# Patient Record
Sex: FEMALE | Race: WHITE | NOT HISPANIC OR LATINO | Employment: OTHER | URBAN - METROPOLITAN AREA
[De-identification: names, ages, dates, MRNs, and addresses within clinical notes are randomized per-mention and may not be internally consistent; named-entity substitution may affect disease eponyms.]

---

## 2018-03-13 ENCOUNTER — HISTORICAL (OUTPATIENT)
Dept: RADIOLOGY | Facility: HOSPITAL | Age: 73
End: 2018-03-13

## 2021-03-20 ENCOUNTER — HOSPITAL ENCOUNTER (OUTPATIENT)
Dept: MEDSURG UNIT | Facility: HOSPITAL | Age: 76
End: 2021-03-22

## 2021-03-20 LAB
ABS NEUT (OLG): 15.6 X10(3)/MCL (ref 1.5–6.9)
ABS NEUT (OLG): 18.8 X10(3)/MCL (ref 1.5–6.9)
ALBUMIN SERPL-MCNC: 3.2 GM/DL (ref 3.4–4.8)
ALBUMIN SERPL-MCNC: 3.9 GM/DL (ref 3.4–4.8)
ALBUMIN/GLOB SERPL: 1 RATIO (ref 1.1–2)
ALBUMIN/GLOB SERPL: 1 RATIO (ref 1.1–2)
ALP SERPL-CCNC: 70 UNIT/L (ref 40–150)
ALP SERPL-CCNC: 88 UNIT/L (ref 40–150)
ALT SERPL-CCNC: 12 UNIT/L (ref 0–55)
ALT SERPL-CCNC: 14 UNIT/L (ref 0–55)
APPEARANCE, UA: CLEAR
AST SERPL-CCNC: 11 UNIT/L (ref 5–34)
AST SERPL-CCNC: 16 UNIT/L (ref 5–34)
BACTERIA SPEC CULT: NORMAL /HPF
BASOPHILS # BLD AUTO: 0 X10(3)/MCL (ref 0–0.1)
BASOPHILS # BLD AUTO: 0.1 X10(3)/MCL (ref 0–0.1)
BASOPHILS NFR BLD AUTO: 0 % (ref 0–1)
BASOPHILS NFR BLD AUTO: 0 % (ref 0–1)
BILIRUB SERPL-MCNC: 0.2 MG/DL
BILIRUB SERPL-MCNC: 0.5 MG/DL
BILIRUB UR QL STRIP: NEGATIVE
BILIRUBIN DIRECT+TOT PNL SERPL-MCNC: 0.1 MG/DL (ref 0–0.5)
BILIRUBIN DIRECT+TOT PNL SERPL-MCNC: 0.1 MG/DL (ref 0–0.8)
BILIRUBIN DIRECT+TOT PNL SERPL-MCNC: 0.2 MG/DL (ref 0–0.5)
BILIRUBIN DIRECT+TOT PNL SERPL-MCNC: 0.3 MG/DL (ref 0–0.8)
BNP BLD-MCNC: 40.6 PG/ML
BNP BLD-MCNC: 80.3 PG/ML
BUN SERPL-MCNC: 12 MG/DL (ref 9.8–20.1)
BUN SERPL-MCNC: 13 MG/DL (ref 9.8–20.1)
CALCIUM SERPL-MCNC: 10 MG/DL (ref 8.4–10.2)
CALCIUM SERPL-MCNC: 9.4 MG/DL (ref 8.4–10.2)
CHLORIDE SERPL-SCNC: 100 MMOL/L (ref 98–107)
CHLORIDE SERPL-SCNC: 97 MMOL/L (ref 98–107)
CK MB SERPL-MCNC: 1 NG/ML
CK MB SERPL-MCNC: 1.1 NG/ML
CK SERPL-CCNC: 35 U/L (ref 29–168)
CK SERPL-CCNC: 38 U/L (ref 29–168)
CO2 SERPL-SCNC: 29 MMOL/L (ref 23–31)
CO2 SERPL-SCNC: 32 MMOL/L (ref 23–31)
COLOR UR: YELLOW
CREAT SERPL-MCNC: 0.5 MG/DL (ref 0.55–1.02)
CREAT SERPL-MCNC: 0.52 MG/DL (ref 0.55–1.02)
D DIMER PPP IA.FEU-MCNC: 0.35 MCG/ML FEU
EOSINOPHIL # BLD AUTO: 0.1 X10(3)/MCL (ref 0–0.6)
EOSINOPHIL NFR BLD AUTO: 1 % (ref 0–5)
ERYTHROCYTE [DISTWIDTH] IN BLOOD BY AUTOMATED COUNT: 13.9 % (ref 11.5–17)
ERYTHROCYTE [DISTWIDTH] IN BLOOD BY AUTOMATED COUNT: 14.1 % (ref 11.5–17)
GLOBULIN SER-MCNC: 3.3 GM/DL (ref 2.4–3.5)
GLOBULIN SER-MCNC: 3.9 GM/DL (ref 2.4–3.5)
GLUCOSE (UA): NEGATIVE MG/DL
GLUCOSE SERPL-MCNC: 127 MG/DL (ref 82–115)
GLUCOSE SERPL-MCNC: 206 MG/DL (ref 82–115)
HCT VFR BLD AUTO: 34.5 % (ref 36–48)
HCT VFR BLD AUTO: 40.7 % (ref 36–48)
HGB BLD-MCNC: 10.7 GM/DL (ref 12–16)
HGB BLD-MCNC: 12.2 GM/DL (ref 12–16)
HGB UR QL STRIP: ABNORMAL
IMM GRANULOCYTES # BLD AUTO: 0.09 10*3/UL (ref 0–0.02)
IMM GRANULOCYTES # BLD AUTO: 0.12 10*3/UL (ref 0–0.02)
IMM GRANULOCYTES NFR BLD AUTO: 0.5 % (ref 0–0.43)
IMM GRANULOCYTES NFR BLD AUTO: 0.6 % (ref 0–0.43)
KETONES UR QL STRIP: NEGATIVE MG/DL
LACTATE SERPL-SCNC: 1 MMOL/L (ref 0.5–2.2)
LACTATE SERPL-SCNC: 1.8 MMOL/L (ref 0.5–2.2)
LACTATE SERPL-SCNC: 2.2 MMOL/L (ref 0.5–2.2)
LACTATE SERPL-SCNC: 2.4 MMOL/L (ref 0.5–2.2)
LACTATE SERPL-SCNC: 2.8 MMOL/L (ref 0.5–2.2)
LACTATE SERPL-SCNC: 3 MMOL/L (ref 0.5–2.2)
LACTATE SERPL-SCNC: 3 MMOL/L (ref 0.5–2.2)
LACTATE SERPL-SCNC: 3.8 MMOL/L (ref 0.5–2.2)
LDH SERPL-CCNC: 116 UNIT/L (ref 140–271)
LEUKOCYTE ESTERASE UR QL STRIP: ABNORMAL
LYMPHOCYTES # BLD AUTO: 0.5 X10(3)/MCL (ref 0.5–4.1)
LYMPHOCYTES # BLD AUTO: 1.9 X10(3)/MCL (ref 0.5–4.1)
LYMPHOCYTES NFR BLD AUTO: 10 % (ref 15–40)
LYMPHOCYTES NFR BLD AUTO: 2 % (ref 15–40)
MCH RBC QN AUTO: 27 PG (ref 27–34)
MCH RBC QN AUTO: 28 PG (ref 27–34)
MCHC RBC AUTO-ENTMCNC: 30 GM/DL (ref 31–36)
MCHC RBC AUTO-ENTMCNC: 31 GM/DL (ref 31–36)
MCV RBC AUTO: 90 FL (ref 80–99)
MCV RBC AUTO: 92 FL (ref 80–99)
MONOCYTES # BLD AUTO: 0.1 X10(3)/MCL (ref 0–1.1)
MONOCYTES # BLD AUTO: 1.5 X10(3)/MCL (ref 0–1.1)
MONOCYTES NFR BLD AUTO: 1 % (ref 4–12)
MONOCYTES NFR BLD AUTO: 8 % (ref 4–12)
NEUTROPHILS # BLD AUTO: 15.6 X10(3)/MCL (ref 1.5–6.9)
NEUTROPHILS # BLD AUTO: 18.8 X10(3)/MCL (ref 1.5–6.9)
NEUTROPHILS NFR BLD AUTO: 81 % (ref 43–75)
NEUTROPHILS NFR BLD AUTO: 96 % (ref 43–75)
NITRITE UR QL STRIP: NEGATIVE
PH UR STRIP: 7 [PH] (ref 4.6–8)
PLATELET # BLD AUTO: 318 X10(3)/MCL (ref 140–400)
PLATELET # BLD AUTO: 383 X10(3)/MCL (ref 140–400)
PMV BLD AUTO: 9.3 FL (ref 6.8–10)
PMV BLD AUTO: 9.6 FL (ref 6.8–10)
POTASSIUM SERPL-SCNC: 3.1 MMOL/L (ref 3.5–5.1)
POTASSIUM SERPL-SCNC: 3.5 MMOL/L (ref 3.5–5.1)
PROT SERPL-MCNC: 6.5 GM/DL (ref 5.8–7.6)
PROT SERPL-MCNC: 7.8 GM/DL (ref 5.8–7.6)
PROT UR QL STRIP: NEGATIVE MG/DL
RBC # BLD AUTO: 3.84 X10(6)/MCL (ref 4.2–5.4)
RBC # BLD AUTO: 4.45 X10(6)/MCL (ref 4.2–5.4)
RBC #/AREA URNS HPF: NORMAL /[HPF]
SARS-COV-2 RNA RESP QL NAA+PROBE: NOT DETECTED
SODIUM SERPL-SCNC: 139 MMOL/L (ref 136–145)
SODIUM SERPL-SCNC: 140 MMOL/L (ref 136–145)
SP GR UR STRIP: 1.02 (ref 1–1.03)
SQUAMOUS EPITHELIAL, UA: NORMAL /LPF
TROPONIN I SERPL-MCNC: 0.01 NG/ML (ref 0–0.04)
TROPONIN I SERPL-MCNC: 0.01 NG/ML (ref 0–0.04)
UROBILINOGEN UR STRIP-ACNC: 0.2 EU/DL
WBC # SPEC AUTO: 19.4 X10(3)/MCL (ref 4.5–11.5)
WBC # SPEC AUTO: 19.6 X10(3)/MCL (ref 4.5–11.5)
WBC #/AREA URNS HPF: NORMAL /HPF

## 2021-03-21 LAB
ABS NEUT (OLG): 21.2 X10(3)/MCL (ref 1.5–6.9)
ALBUMIN SERPL-MCNC: 3.1 GM/DL (ref 3.4–4.8)
ALBUMIN/GLOB SERPL: 1 RATIO (ref 1.1–2)
ALP SERPL-CCNC: 65 UNIT/L (ref 40–150)
ALT SERPL-CCNC: 11 UNIT/L (ref 0–55)
AST SERPL-CCNC: 11 UNIT/L (ref 5–34)
BASOPHILS NFR BLD MANUAL: 0 % (ref 0–1)
BILIRUB SERPL-MCNC: <0.5 MG/DL
BILIRUBIN DIRECT+TOT PNL SERPL-MCNC: 0.1 MG/DL (ref 0–0.5)
BILIRUBIN DIRECT+TOT PNL SERPL-MCNC: <0.4 MG/DL (ref 0–0.8)
BUN SERPL-MCNC: 11 MG/DL (ref 9.8–20.1)
CALCIUM SERPL-MCNC: 9.4 MG/DL (ref 8.4–10.2)
CHLORIDE SERPL-SCNC: 100 MMOL/L (ref 98–107)
CO2 SERPL-SCNC: 32 MMOL/L (ref 23–31)
COLOR STL: NORMAL
CONSISTENCY STL: NORMAL
CREAT SERPL-MCNC: 0.48 MG/DL (ref 0.55–1.02)
EOSINOPHIL NFR BLD MANUAL: 0 % (ref 0–5)
ERYTHROCYTE [DISTWIDTH] IN BLOOD BY AUTOMATED COUNT: 14 % (ref 11.5–17)
FERRITIN SERPL-MCNC: 210.37 NG/ML (ref 4.63–204)
GLOBULIN SER-MCNC: 3.1 GM/DL (ref 2.4–3.5)
GLUCOSE SERPL-MCNC: 127 MG/DL (ref 82–115)
GRANULOCYTES NFR BLD MANUAL: 93 % (ref 47–80)
HCT VFR BLD AUTO: 31.9 % (ref 36–48)
HEMOCCULT SP1 STL QL: NEGATIVE
HGB BLD-MCNC: 9.9 GM/DL (ref 12–16)
IRON SATN MFR SERPL: 8 % (ref 20–50)
IRON SERPL-MCNC: 22 UG/DL (ref 50–170)
LYMPHOCYTES NFR BLD MANUAL: 3 % (ref 15–40)
MAGNESIUM SERPL-MCNC: 2 MG/DL (ref 1.6–2.6)
MCH RBC QN AUTO: 28 PG (ref 27–34)
MCHC RBC AUTO-ENTMCNC: 31 GM/DL (ref 31–36)
MCV RBC AUTO: 89 FL (ref 80–99)
MONOCYTES NFR BLD MANUAL: 3 % (ref 4–12)
NEUTS BAND NFR BLD MANUAL: 1 % (ref 1–5)
PHOSPHATE SERPL-MCNC: 1.8 MG/DL (ref 2.3–4.7)
PLATELET # BLD AUTO: 321 X10(3)/MCL (ref 140–400)
PLATELET # BLD EST: ADEQUATE 10*3/UL
PMV BLD AUTO: 9.2 FL (ref 6.8–10)
POTASSIUM SERPL-SCNC: 2.6 MMOL/L (ref 3.5–5.1)
PROT SERPL-MCNC: 6.2 GM/DL (ref 5.8–7.6)
RBC # BLD AUTO: 3.59 X10(6)/MCL (ref 4.2–5.4)
RBC MORPH BLD: NORMAL
SODIUM SERPL-SCNC: 140 MMOL/L (ref 136–145)
TIBC SERPL-MCNC: 270 UG/DL (ref 70–310)
TIBC SERPL-MCNC: 292 UG/DL (ref 250–450)
WBC # SPEC AUTO: 22.6 X10(3)/MCL (ref 4.5–11.5)

## 2021-03-22 LAB
ABS NEUT (OLG): 21.9 X10(3)/MCL (ref 1.5–6.9)
BASOPHILS NFR BLD MANUAL: 0 % (ref 0–1)
BUN SERPL-MCNC: 18 MG/DL (ref 9.8–20.1)
CALCIUM SERPL-MCNC: 9.1 MG/DL (ref 8.4–10.2)
CHLORIDE SERPL-SCNC: 100 MMOL/L (ref 98–107)
CO2 SERPL-SCNC: 32 MMOL/L (ref 23–31)
CREAT SERPL-MCNC: 0.53 MG/DL (ref 0.55–1.02)
CREAT/UREA NIT SERPL: 34
EOSINOPHIL NFR BLD MANUAL: 0 % (ref 0–5)
ERYTHROCYTE [DISTWIDTH] IN BLOOD BY AUTOMATED COUNT: 14.6 % (ref 11.5–17)
GLUCOSE SERPL-MCNC: 128 MG/DL (ref 82–115)
GRANULOCYTES NFR BLD MANUAL: 95 % (ref 47–80)
HCT VFR BLD AUTO: 33.1 % (ref 36–48)
HGB BLD-MCNC: 10.3 GM/DL (ref 12–16)
LYMPHOCYTES NFR BLD MANUAL: 3 % (ref 15–40)
MCH RBC QN AUTO: 28 PG (ref 27–34)
MCHC RBC AUTO-ENTMCNC: 31 GM/DL (ref 31–36)
MCV RBC AUTO: 89 FL (ref 80–99)
MONOCYTES NFR BLD MANUAL: 2 % (ref 4–12)
PLATELET # BLD AUTO: 370 X10(3)/MCL (ref 140–400)
PLATELET # BLD EST: ADEQUATE 10*3/UL
PMV BLD AUTO: 9.5 FL (ref 6.8–10)
POTASSIUM SERPL-SCNC: 3.6 MMOL/L (ref 3.5–5.1)
RBC # BLD AUTO: 3.73 X10(6)/MCL (ref 4.2–5.4)
RBC MORPH BLD: NORMAL
SODIUM SERPL-SCNC: 140 MMOL/L (ref 136–145)
WBC # SPEC AUTO: 23.5 X10(3)/MCL (ref 4.5–11.5)

## 2021-03-25 LAB
FINAL CULTURE: NORMAL
FINAL CULTURE: NORMAL

## 2022-04-30 NOTE — H&P
HISTORY OF PRESENT ILLNESS:  She is a patient of Dr. Garrett, who presented to the emergency room today with a COPD exacerbation/shortness of breath.  She also has a small pneumonia versus small effusion on the right lower lobe.  She has interstitial edema of the lower lungs as well.  She is very anxious also.  She is a former smoker, quit approximately 25 years ago.  She has had previous chest tube insertion secondary to a bleb from her emphysema which had to be excised also.  She has been followed by Dr. Mcdowell in [place] for Pulmonology.  She is on home O2 and nebulizer.    PAST MEDICAL HISTORY:  Otherwise, is significant for hypertension.    PAST SURGERIES:  Are as stated above.  She had a hysterectomy and cholecystectomy also, along with insertion of a chest tube for a ruptured bleb and later a left lobectomy.    ALLERGIES:  INCLUDE LEVAQUIN.      MEDICATIONS:  On the chart and reviewed.    IMMUNIZATIONS:  She states she did not get a flu shot this year.  She thinks her pneumonia shot is up to date and she did not get a COVID vaccination this year.    FAMILY MEDICAL HISTORY:  Noncontributory.    CODE STATUS:  She is full code.    SOCIAL HISTORY:  She quit smoking 25 years ago, as stated above.  She does not use alcohol.  She is .    PHYSICAL EXAMINATION:  VITAL SIGNS:  Patient's vital signs are within normal limits except for blood pressure 145/82 and pulse rate 120.  Pulse ox on 2 L of oxygen per nasal cannula is between 94% and 98%.     HEENT: Examination is grossly within normal limits.     NECK:  Supple. Full range of motion.  No significant adenopathy.  She is overall a very thin appearing small lady with pursed mouth breathing.     HEART: Is tachycardic, distant. Regular rhythm.       LUNGS:  Diminished breath sounds in the bases bilaterally.     ABDOMEN:  Nontender, nondistended, normoactive bowel sounds.   EXTREMITIES: No significant edema.     /RECTAL/BREASTS:  Examination deferred at  this time.   NEUROLOGICAL: Moves all 4 extremities well.  She is alert and oriented x3.    LABS:  Reviewed and on the chart along with x-rays.    ASSESSMENT:    1. Chronic obstructive pulmonary disease exacerbation.  2. Pneumonia versus pleural effusion.  3. Interstitial edema.  4. Elevated lactic acid.  5. Anxiety.  6. Hypertension.  7. Tachycardia.    PLAN:  Give Xanax and help control blood pressure as well.  Give steroids and neb treatments, antibiotics.        PBS/MODL   DD: 03/20/2021 1357   DT: 03/20/2021 1415  Job # [id number]

## 2022-04-30 NOTE — ED PROVIDER NOTES
Patient:   Kylie Almonte            MRN: [id number]            FIN: [id number]               Age:   75 years     Sex:  Female     :  1945   Associated Diagnoses:   COPD exacerbation   Author:   Sushant Manzano MD      Basic Information   Time seen: Date & time 3/20/2021 06:50:00.   History source: Patient.   Arrival mode: Ambulance.   History limitation: None.   Additional information: Chief Complaint from Nursing Triage Note : Chief Complaint   3/20/2021 6:55 CDT       Chief Complaint           brought in by [facility]. respiratory distress x 1 week. accessory muscle use. coughing up thick, yellow/greenish phlegm  .   Provider/Visit info:   Time Seen:  Sushant Manzano MD / 2021 06:50  .   History of Present Illness   The patient presents with difficulty breathing, cough and wheezing.  The onset was Few days.  The course/duration of symptoms is constant.  Degree at onset mild.  Degree at present mild.  The Exacerbating factors is none.  There are Relieving factorss including oxygen and beta-agonist.  Risk factors consist of hypertension and chronic obstructive pulmonary disease.  Prior episodes: frequent.  Therapy today: oxygen.  Associated symptoms: none.        Review of Systems   Constitutional symptoms:  No fever, no chills, no sweats.    Skin symptoms:  No jaundice, no rash.    Eye symptoms:  Negative except as documented in HPI.   ENMT symptoms:  No sore throat,    Respiratory symptoms:  Shortness of breath, cough, No wheezing, , Sputum production: Yellow, green.    Cardiovascular symptoms:  No chest pain, no palpitations, no tachycardia.    Gastrointestinal symptoms:  No abdominal pain, no nausea, no vomiting, no diarrhea, no constipation.    Genitourinary symptoms:  No dysuria,    Musculoskeletal symptoms:  No back pain,    Neurologic symptoms:  No headache, no dizziness.    Psychiatric symptoms:  No anxiety, no depression, no substance abuse.    Allergy/immunologic symptoms:  No  seasonal allergies,              Additional review of systems information: All other systems reviewed and otherwise negative.      Health Status   Allergies:    Allergic Reactions (Selected)  Severity Not Documented  Levaquin- Rash..   Medications:  (Selected)   Documented Medications  Documented  alPRAzolam 0.25 mg oral tab: 0.125 mg, 0.5-1 tab(s), Oral, TID  albuterol-ipratropium 2.5 mg-0.5 mg/3 mL inhalation solution: 3 mL, NEB, QID  alendronate 70 mg oral tablet: 70 mg = 1 tab(s), Oral, qWeek  amLODIPine 5 mg oral tablet: 5 mg = 1 tab(s), Oral, Daily  atorvastatin 10 mg oral tablet: 10 mg = 1 tab(s), Oral, qPM  doxycycline monohydrate 50 mg oral tablet: 100 mg = 2 tab(s), Oral, BID  omeprazole 40 mg oral DR capsule: 40 mg = 1 cap(s), Oral, Daily  prednisONE 20 mg oral tablet: 20 mg = 1 tab(s), Oral, BID, per nurse's notes.      Past Medical/ Family/ Social History   Medical history:    Active  COPD - Chronic obstructive pulmonary disease (SNOMED CT 702931337)  HTN - Hypertension (SNOMED CT 0269403993)  Chronic emphysema (SNOMED CT 366920627)  Anxiety disorder (SNOMED CT 131922818)  Resolved  Bullous emphysema with collapse (SNOMED CT 014779848):  Resolved., Reviewed as documented in chart.   Surgical history:    Cholecystectomy (53358626).  Hysterectomy (341546344).  Insertion of chest tube Multiple Times (5895310316).  Excision of emphysematous bleb of lung Left (6502242935)., Reviewed as documented in chart.   Family history:    Hypertension.  Father ()  Mother ()  Unknown cause of morbidity or mortality.....  Father ()  , Reviewed as documented in chart.   Social history:    Social & Psychosocial Habits    Alcohol  2021  Use: Never    2021  Use: Never    Substance Use  2021  Use: Never    Tobacco  2021  Use: Former smoker, quit more    Type: Cigarettes    Patient Wants Consult For Cessation Counseling No    2021  Use: Former smoker, quit more    Patient  Wants Consult For Cessation Counseling N/A    Abuse/Neglect  02/03/2021  SHX Any signs of abuse or neglect No    03/20/2021  SHX Any signs of abuse or neglect No  , Reviewed as documented in chart.   Problem list:    Active Problems (4)  Anxiety disorder   Chronic emphysema   COPD - Chronic obstructive pulmonary disease   HTN - Hypertension   , per nurse's notes.      Physical Examination               Vital Signs   Vital Signs   3/20/2021 7:42 CDT       Heart Rate Monitored      99 bpm                             Respiratory Rate          28 br/min  HI                             SpO2                      98 %                             Saturation Probe Site     Hand, left                             Oxygen Therapy            Nasal cannula                             Oxygen Flow Rate          2 L/min    3/20/2021 6:55 CDT       Temperature Temporal Artery               36.6 DegC                             Peripheral Pulse Rate     118 bpm  HI                             SpO2                      94 %                             Oxygen Therapy            Room air                             Systolic Blood Pressure   152 mmHg  HI                             Diastolic Blood Pressure  107 mmHg  HI  .   Measurements   3/20/2021 6:55 CDT       Weight Dosing             35 kg                             Weight Measured and Calculated in Lbs     77.16 lb                             Weight Estimated          35 kg                             Height/Length Dosing      153 cm                             Height/Length Estimated   152 cm                             Body Mass Index Estimated 15.15 kg/m2  .   Basic Oxygen Information   3/20/2021 7:42 CDT       SpO2                      98 %                             Oxygen Therapy            Nasal cannula                             Oxygen Flow Rate          2 L/min    3/20/2021 6:55 CDT       SpO2                      94 %                             Oxygen Therapy             Room air  .   General:  Alert, no acute distress.    Skin:  Warm, dry.    Head:  Normocephalic.   Eye:  Extraocular movements are intact.   Ears, nose, mouth and throat:  Oral mucosa moist.   Cardiovascular:  Regular rate and rhythm, No murmur, Normal peripheral perfusion, No edema.    Respiratory:  Respirations: Regular, respiratory distress mild, Breath sounds: Right, diminished, wheezes present mild, Breath sounds: Bilateral, wheezes present mild.    Back:  Normal range of motion.   Musculoskeletal:  Normal ROM.   Chest wall   Gastrointestinal:  Soft, Nontender, Non distended, Normal bowel sounds.    Neurological:  Alert and oriented to person, place, time, and situation, normal motor observed, normal speech observed.    Lymphatics   Psychiatric:  Cooperative, appropriate mood & affect.       Medical Decision Making   Documents reviewed:  Emergency department nurses' notes.   Orders  Launch Order Profile (Selected)   Inpatient Orders  Ordered  Blood Glucose Monitoring POC: 21 7:21:00 CDT, Once-NOW, Stop date 21 7:21:00 CDT, 21 7:21:00 CDT  Cardiac Monitorin21 7:21:00 CDT, Constant Order, Place on telemetry.  Contact MD for order for Aspirin and NTG.: 21 7:21:00 CDT, Contact MD for order for Aspirin and NTG.  IVF Normal Saline NS Infusion 1,000 mL: 1,000 mL, 1,000 mL, IV, 150 mL/hr, start date 21 7:21:00 CDT, 1.25, m2  Oxygen Therapy: 21 7:21:00 CDT, Nasal Cannula, Maintain O2 saturation > 93%., CM Oxygen  Pulse Oximetry: 21 7:21:00 CDT, Stop date 21 7:21:00 CDT, Place on pulse oximetry.  Monitor oxygen saturation.  Ordered (Collected)  Urine Culture [id number]: Stat collect, 21 7:21:00 CDT, Urine, Collected, Nurse collect, [id number], Stop date 21 7:21:00 CDT  Ordered (Dispatched)  BNP: Stat collect, 21 7:21:00 CDT, Blood, Stop date 21 7:21:00 CDT, Lab Collect, 21 7:21:00 CDT  Blood Culture: 21 7:21:00 CDT, Stat collect,  Blood, Stop date 03/20/21 7:21:00 CDT  Blood Culture: 03/20/21 7:21:00 CDT, Stat collect, Blood, Stop date 03/20/21 7:21:00 CDT  CBC w/ Auto Diff: Stat collect, 03/20/21 7:21:00 CDT, Blood, Stop date 03/20/21 7:21:00 CDT, Lab Collect, 03/20/21 7:21:00 CDT  CK MB: Stat collect, 03/20/21 7:21:00 CDT, Blood, Stop date 03/20/21 7:21:00 CDT, Lab Collect, Print Label By Order Location, 03/20/21 7:21:00 CDT  CMP: Stat collect, 03/20/21 7:21:00 CDT, Blood, Stop date 03/20/21 7:21:00 CDT, Lab Collect, 03/20/21 7:21:00 CDT  CPK: Stat collect, 03/20/21 7:21:00 CDT, Blood, Stop date 03/20/21 7:21:00 CDT, Lab Collect, Print Label By Order Location, 03/20/21 7:21:00 CDT  Lactic Acid: Stat collect, 03/20/21 7:21:00 CDT, Blood, Stop date 03/20/21 7:21:00 CDT, Lab Collect, 03/20/21 7:21:00 CDT  Troponin-I: Stat collect, 03/20/21 7:21:00 CDT, Blood, Stop date 03/20/21 7:21:00 CDT, Lab Collect, Print Label By Order Location, 03/20/21 7:21:00 CDT  Ordered (Exam Completed)  XR Chest 1 View: Stat, 03/20/21 7:03:00 CDT, Dyspnea, None, Stretcher, Rad Type, Not Scheduled, 03/20/21 7:03:00 CDT, Not Scheduled  Canceled  Aerosol Treatment: 03/20/21 7:11:00 CDT, 03/20/21 7:11:00 CDT, [id number]  Completed  Aerosol Treatment: 03/20/21 7:11:00 CDT, 03/20/21 7:11:00 CDT, [id number]  Aerosol Treatment: 03/20/21 7:11:00 CDT, 03/20/21 7:11:00 CDT, [id number]  Blood Pressure: 03/20/21 7:21:00 CDT, Stop date 03/20/21 7:21:00 CDT, Monitor blood pressure.  DuoNeb 0.5 mg-2.5 mg/3 mL inhalation solution: 3 mL, form: Soln-Inh, NEB, Once-NOW, first dose 03/20/21 7:11:00 CDT, stop date 03/20/21 7:11:00 CDT, STAT, On Compressed Air only  EKG Adult 12 Lead: 03/20/21 7:03:00 CDT, Stat, Once-NOW, 03/20/21 7:03:00 CDT, -1, -1, 03/20/21 7:03:00 CDT  Saline Lock Insert: 03/20/21 7:21:00 CDT, Once-NOW, Stop date 03/20/21 7:21:00 CDT  Solu-Medrol 125 mg injection: 125 mg, form: Injection, IV Push, Once-NOW, first dose 03/20/21 7:11:00 CDT, stop date 03/20/21  7:11:00 CDT, STAT  UA Only Microscopic: Stat collect, Urine, Collected, 03/20/21 7:21:00 CDT, Stop date 03/20/21 7:21:00 CDT, Nurse collect  Urinalysis with Microscopic if Indicated: Stat collect, Urine, 03/20/21 7:21:00 CDT, Stop date 03/20/21 7:21:00 CDT, Nurse collect  budesonide 0.5 mg/2 mL inhalation suspension: 0.5 mg, form: Soln-Inh, NEB, Once-NOW, first dose 03/20/21 7:11:00 CDT, stop date 03/20/21 7:11:00 CDT, STAT, On Compressed Air only.   Electrocardiogram:  Time 3/20/2021 07:00:00, rate 113, normal sinus rhythm, No ST-T changes, no ectopy, normal SD & QRS intervals, Right axis, short SD, minimal ST depression inferior lateral and anterior leads.    Results review:  Lab results : Lab View   3/20/2021 8:22 CDT       Est Creat Clearance Ser   54.54 mL/min    3/20/2021 7:58 CDT       Sodium Lvl                140 mmol/L                             Potassium Lvl             3.5 mmol/L                             Chloride                  97 mmol/L  LOW                             CO2                       32 mmol/L  HI                             Calcium Lvl               10.0 mg/dL                             Glucose Lvl               127 mg/dL  HI                             BUN                       12.0 mg/dL                             Creatinine                0.50 mg/dL  LOW                             eGFR-AA                   >60  NA                             eGFR-JALEN                  >60 mls/min/1.73/m2  NA                             Bili Total                0.5 mg/dL                             Bili Direct               0.2 mg/dL                             Bili Indirect             0.30 mg/dL                             AST                       16 unit/L                             ALT                       14 unit/L                             Alk Phos                  88 unit/L                             Total Protein             7.8 gm/dL  HI                             Albumin Lvl                3.9 gm/dL                             Globulin                  3.9 gm/dL  HI                             A/G Ratio                 1.0 ratio  LOW                             BNP                       40.6 pg/mL                             Lactic Acid Lvl           2.8 mmol/L  CRIT                             Total CK                  38 U/L                             CK MB                     1.0 ng/mL                             Troponin-I                0.010 ng/mL                             WBC                       19.4 x10(3)/mcL  HI                             RBC                       4.45 x10(6)/mcL                             Hgb                       12.2 gm/dL                             Hct                       40.7 %                             Platelet                  383 x10(3)/mcL                             MCV                       92 fL                             MCH                       27 pg                             MCHC                      30 gm/dL  LOW                             RDW                       13.9 %                             MPV                       9.3 fL                             Abs Neut                  15.6 x10(3)/mcL  HI                             Neutro Auto               81 %  HI                             Lymph Auto                10 %  LOW                             Mono Auto                 8 %                             Eos Auto                  1 %                             Abs Eos                   0.1 x10(3)/mcL                             Basophil Auto             0 %                             Abs Neutro                15.6 x10(3)/mcL  HI                             Abs Lymph                 1.9 x10(3)/mcL                             Abs Mono                  1.5 x10(3)/mcL  HI                             Abs Baso                  0.1 x10(3)/mcL                             IG%                       0.500 %  HI                              IG#                       0.0900  HI    3/20/2021 7:24 CDT       SARS-CoV-2 PCR            Not Detected    3/20/2021 7:21 CDT       UA Appear                 Clear                             UA Color                  Yellow                             UA Spec Grav              1.020                             UA Bili                   Negative                             UA pH                     7.0                             UA Urobilinogen           0.2 EU/dL                             UA Blood                  Trace-intact                             UA Glucose                Negative mg/dL                             UA Ketones                Negative mg/dL                             UA Protein                Negative mg/dL                             UA Nitrite                Negative                             UA Leuk Est               Trace                             UA WBC                    2-5 /HPF                             UA RBC                    None Seen                             UA Bacteria               None Seen /HPF                             UA Squam Epithelial       Few /LPF  ,    No qualifying data available.    Radiology results:  Rad Results (ST)  < 12 hrs   Accession: [id number]  Order: XR Chest 1 View  Report Dt/Tm: 03/20/2021 09:12  Report:   EXAMINATION: AP view the chest     EXAMINATION DATE: 3/20/2021     COMPARISON: 2/3/2021     TECHNIQUE: As above     CLINICAL HISTORY: Dyspnea     FINDINGS:     Heart size within normal limits. The pulmonary vasculature is normal.     Coarse interstitial markings the lungs with hyperinflation and  blunting the bilateral costophrenic angles. Lucencies are identified  of the upper lungs.     IMPRESSION:     Query for interstitial edema of the lower lungs with changes of COPD.  Questionable small effusion on the right.    .      Reexamination/ Reevaluation   Time: 3/20/2021 07:53:00 .   Interventions: PowerOrders    Pharmacy:  cefTRIAXone 1 g injection (Order): 1 gm, IV, Daily, first dose 3/20/2021 7:53 CDT, STAT.   Time: 3/20/2021 08:30:00 .   Interventions: PowerOrders   Pharmacy:  Lactated Ringers 1000ml 1,000 mL (Order): 1,000 mL, 1,000 mL, IV, Once-NOW, 1,000 mL/hr, start date 3/20/2021 8:30 CDT, 1.25, m2.   Notes: Patient has elevated lactic acid, I am going to give her 1 L of Ringer's lactate bolus, and continue normal saline, her lactic acid is less than 4.5, and she is not hypotensive, she is an elderly lady so I am going to continue normal saline after that..   Time: 3/20/2021 09:36:00 .   Vital signs   results included from flowsheet : Vital Signs   3/20/2021 9:35 CDT       Respiratory Rate          20 br/min                             SpO2                      96 %                             Oxygen Therapy            Nasal cannula                             Oxygen Flow Rate          2 L/min    3/20/2021 9:34 CDT       Peripheral Pulse Rate     101 bpm  HI                             Heart Rate Monitored      105 bpm  HI                             Respiratory Rate          In Error br/min  (In Error)                            SpO2                      In Error %  (In Error)                            Oxygen Therapy            Nasal cannula                             Oxygen Flow Rate          2 L/min                             Systolic Blood Pressure   156 mmHg  HI                             Diastolic Blood Pressure  92 mmHg  HI                             Mean Arterial Pressure, Cuff              113 mmHg     Notes: Patient says she is feeling better, still has some dizziness and wheezing.    S1, S2 is audible no murmurs.  Chest decreased air entry bilaterally, minimal rhonchi audible bilaterally  Abdomen soft nondistended nontender  Extremities no pedal edema  2+ peripheral pulses bilaterally upper and lower extremities  Capillary refill is normal    Patient's oxygen saturation is up to 100% on 2 L nasal  cannula, she is comfortable she still has some tachycardia, her blood pressures maintained, appears like she still needs to be in the hospital.  Her lactic acid level is slightly elevated rest of the workup shows some increase congestion bilateral lung fields with emphysematous blebs bilaterally.    She has elevated lactic acid, she has been given IV antibiotics, I will admit her in the hospital, continue her on oxygen, Neb treatments, antibiotics, she does have a small pleural effusion on the right side,.      Impression and Plan   Diagnosis   COPD exacerbation (QPJ19-JR J44.1)      Calls-Consults   -  3/20/2021 09:40:00 .   Plan   Condition: Improved, Stable.    Disposition: Admit time  3/20/2021 09:43:00, Admit to Inpatient Telemetry Unit.    Orders: Launch Orders   Admit/Transfer/Discharge:  Admit as Inpatient (Order): 3/20/2021 9:43 CDT, Telemetry with Monitor Gerry MITTAL, Germain Jain, No, Acute exacerbation of COPD, pleural effusion,.

## 2022-04-30 NOTE — DISCHARGE SUMMARY
Patient:   Kylie Almonte            MRN: [id number]            FIN: [id number]               Age:   75 years     Sex:  Female     :  1945   Associated Diagnoses:   Iron deficiency anemia; Hypokalemia; COPD exacerbation   Author:   Germain Garrett MD      Discharge Information      Discharge Summary Information   Admitted  3/20/2021   Discharged  3/22/2021   Admitting physician     Germain Garrett MD.     Discharge diagnosis     Iron deficiency anemia (FJD54-CC D50.9).     Hypokalemia (QWJ57-YL E87.6).     COPD exacerbation (ZJC05-TC J44.1).        Physical Examination      Vital Signs (last 24 hrs)_____  Last Charted___________  Temp Oral     36.7 DegC  (MAR 22 12:)  Heart Rate Peripheral   H 117bpm  (MAR 22 12:)  Resp Rate         20 br/min  (MAR 22 11:12)  SBP      H 151mmHg  (MAR 22 12:33)  DBP      80 mmHg  (MAR 22 12:33)  SpO2      98 %  (MAR 22 12:33)     Measurements from flowsheet : Measurements   3/21/2021 14:02 CDT      Weight Estimated          35 kg                             Height/Length Estimated   153 cm                             Usual Weight              50 kg                             Body Mass Index Estimated 14.95 kg/m2                             Ideal Body Weight         45.4 kg        Hospital Course   Hospital Course   Medical management: 75-year-old female with COPD admitted with worsening shortness of breath despite outpatient treatment with oral prednisone, doxycycline, bronchodilators and oxygen.  Chest x-ray revealed no acute infiltration compared to previous film.  Treatment continued with IV steroids, bronchodilators and oxygen.  Hypokalemia corrected with oral and IV potassium.  After she received IV fluids hemoglobin was 10 with no clinical bleeding.  Iron studies were consistent with iron deficiency anemia.  Stool occult blood was negative.  She's had a good clinical response and wants to go home.  She remains dyspneic with minimal exertion  which is her baseline.  Pulse ox remains in the 90s.  She had previously been on hospice services for end-stage COPD but rechecks that at this time.  I will give her a longer tapering course of prednisone, begin by mouth iron therapy and she will resume her home medications, bronchodilators and oxygen..

## 2022-10-28 ENCOUNTER — LAB REQUISITION (OUTPATIENT)
Dept: LAB | Facility: HOSPITAL | Age: 77
End: 2022-10-28
Payer: MEDICARE

## 2022-10-28 DIAGNOSIS — J20.9 ACUTE BRONCHITIS, UNSPECIFIED: ICD-10-CM

## 2022-10-28 LAB
ALBUMIN SERPL-MCNC: 3.1 GM/DL (ref 3.4–4.8)
ALBUMIN/GLOB SERPL: 0.8 RATIO (ref 1.1–2)
ALP SERPL-CCNC: 123 UNIT/L (ref 40–150)
ALT SERPL-CCNC: 16 UNIT/L (ref 0–55)
AST SERPL-CCNC: 19 UNIT/L (ref 5–34)
BILIRUBIN DIRECT+TOT PNL SERPL-MCNC: 0.3 MG/DL
BNP BLD-MCNC: 34.8 PG/ML
BUN SERPL-MCNC: 18 MG/DL (ref 9.8–20.1)
CALCIUM SERPL-MCNC: 9.7 MG/DL (ref 8.4–10.2)
CHLORIDE SERPL-SCNC: 98 MMOL/L (ref 98–107)
CO2 SERPL-SCNC: 30 MMOL/L (ref 23–31)
CREAT SERPL-MCNC: 0.53 MG/DL (ref 0.55–1.02)
ERYTHROCYTE [DISTWIDTH] IN BLOOD BY AUTOMATED COUNT: 13.7 % (ref 11.5–17)
GFR SERPLBLD CREATININE-BSD FMLA CKD-EPI: >60 MLS/MIN/1.73/M2
GLOBULIN SER-MCNC: 3.8 GM/DL (ref 2.4–3.5)
GLUCOSE SERPL-MCNC: 95 MG/DL (ref 82–115)
HCT VFR BLD AUTO: 34.7 % (ref 37–47)
HGB BLD-MCNC: 10.5 GM/DL (ref 12–16)
MCH RBC QN AUTO: 26.3 PG (ref 27–31)
MCHC RBC AUTO-ENTMCNC: 30.3 MG/DL (ref 33–36)
MCV RBC AUTO: 87 FL (ref 80–94)
PLATELET # BLD AUTO: 386 X10(3)/MCL (ref 130–400)
PMV BLD AUTO: 10.2 FL (ref 7.4–10.4)
POTASSIUM SERPL-SCNC: 4.5 MMOL/L (ref 3.5–5.1)
PROT SERPL-MCNC: 6.9 GM/DL (ref 5.8–7.6)
RBC # BLD AUTO: 3.99 X10(6)/MCL (ref 4.2–5.4)
SODIUM SERPL-SCNC: 137 MMOL/L (ref 136–145)
WBC # SPEC AUTO: 15.8 X10(3)/MCL (ref 4.5–11.5)

## 2022-10-28 PROCEDURE — 80053 COMPREHEN METABOLIC PANEL: CPT | Performed by: FAMILY MEDICINE

## 2022-10-28 PROCEDURE — 85027 COMPLETE CBC AUTOMATED: CPT | Performed by: FAMILY MEDICINE

## 2022-10-28 PROCEDURE — 83880 ASSAY OF NATRIURETIC PEPTIDE: CPT | Performed by: FAMILY MEDICINE

## 2023-02-22 ENCOUNTER — HOSPITAL ENCOUNTER (EMERGENCY)
Facility: HOSPITAL | Age: 78
Discharge: HOME | End: 2023-02-22
Attending: STUDENT IN AN ORGANIZED HEALTH CARE EDUCATION/TRAINING PROGRAM
Payer: MEDICARE

## 2023-02-22 VITALS
DIASTOLIC BLOOD PRESSURE: 88 MMHG | WEIGHT: 80 LBS | OXYGEN SATURATION: 94 % | RESPIRATION RATE: 18 BRPM | SYSTOLIC BLOOD PRESSURE: 142 MMHG | HEART RATE: 109 BPM | TEMPERATURE: 98 F

## 2023-02-22 DIAGNOSIS — S41.111A ARM LACERATION, RIGHT, INITIAL ENCOUNTER: ICD-10-CM
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
## 2023-02-22 LAB
FLUAV AG UPPER RESP QL IA.RAPID: NOT DETECTED
FLUBV AG UPPER RESP QL IA.RAPID: NOT DETECTED
SARS-COV-2 RNA RESP QL NAA+PROBE: NOT DETECTED

## 2023-02-22 PROCEDURE — 12002 RPR S/N/AX/GEN/TRNK2.6-7.5CM: CPT

## 2023-02-22 PROCEDURE — 0240U COVID/FLU A&B PCR: CPT | Performed by: STUDENT IN AN ORGANIZED HEALTH CARE EDUCATION/TRAINING PROGRAM

## 2023-02-22 PROCEDURE — 99284 EMERGENCY DEPT VISIT MOD MDM: CPT | Mod: 25

## 2023-02-22 RX ORDER — LIDOCAINE HYDROCHLORIDE 10 MG/ML
5 INJECTION, SOLUTION EPIDURAL; INFILTRATION; INTRACAUDAL; PERINEURAL
Status: DISCONTINUED | OUTPATIENT
Start: 2023-02-22 | End: 2023-02-22 | Stop reason: HOSPADM

## 2023-02-22 NOTE — ED PROVIDER NOTES
Encounter Date: 2/22/2023       History     Chief Complaint   Patient presents with    Fall     Fall at [facility] this am around 0200. Multiple lac to R upper arm. Bleeding controlled.        HPI    77-year-old female with a past medical history as below on continuous oxygen presents emergency department after a fall the night.  Patient states she fell out of bed.  They changed to lay out of her bedroom yesterday which patient thinks is reason she fell.  Denies hitting her head but states she may have.  She is complaining of some low back pain which is new after the fall.  She thinks it may be from trying to get up.  She came in for sutures for her right arm.  She had multiple skin tears from the fall.    Review of patient's allergies indicates:   Allergen Reactions    Levofloxacin Rash     Past Medical History:   Diagnosis Date    Anemia, unspecified     Anxiety disorder, unspecified     COPD (chronic obstructive pulmonary disease)     GERD (gastroesophageal reflux disease)     Hypertension      No past surgical history on file.  No family history on file.  Social History     Tobacco Use    Smoking status: Unknown     Review of Systems   Constitutional:  Negative for fever.   Respiratory:  Positive for cough (chronic). Shortness of breath: chronic.   Cardiovascular:  Negative for chest pain.   Gastrointestinal:  Negative for abdominal pain.   Musculoskeletal:  Positive for back pain.   All other systems reviewed and are negative.    Physical Exam     Initial Vitals [02/22/23 0524]   BP Pulse Resp Temp SpO2   (!) 142/88 109 18 97.5 °F (36.4 °C) (!) 94 %      MAP       --         Physical Exam    Nursing note and vitals reviewed.  Constitutional: She appears well-developed and well-nourished. No distress.   Thin   Cardiovascular:  Normal rate and regular rhythm.           Pulmonary/Chest: Breath sounds normal. No respiratory distress.   Nasal cannula in place   Abdominal: Abdomen is soft. Bowel sounds are normal.  There is no abdominal tenderness.   Musculoskeletal:         General: Tenderness (mild tenderness to lumbar spine) present. Normal range of motion.     Neurological: She is alert and oriented to person, place, and time. GCS score is 15. GCS eye subscore is 4. GCS verbal subscore is 5. GCS motor subscore is 6.   Skin: Capillary refill takes less than 2 seconds.   There are 3 lacerations to the right upper arm.  There is a 6 cm curvilinear laceration to the upper arm that is approximately 0.5 cm in with through the dermis.  There are 2 smaller, 3 cm and 1 cm skin tears as well.   Psychiatric: She has a normal mood and affect. Thought content normal.       ED Course   Lac Repair    Date/Time: 2/22/2023 5:48 AM  Performed by: Castillo Everett MD  Authorized by: Castillo Everett MD     Consent:     Consent obtained:  Verbal    Consent given by:  Patient    Risks, benefits, and alternatives were discussed: yes      Risks discussed:  Infection, poor cosmetic result and need for additional repair    Alternatives discussed:  No treatment  Universal protocol:     Procedure explained and questions answered to patient or proxy's satisfaction: yes      Patient identity confirmed:  Verbally with patient  Anesthesia:     Anesthesia method:  Local infiltration    Local anesthetic:  Lidocaine 1% w/o epi  Laceration details:     Location:  Shoulder/arm    Shoulder/arm location:  R upper arm    Length (cm):  6  Pre-procedure details:     Preparation:  Patient was prepped and draped in usual sterile fashion  Exploration:     Hemostasis achieved with:  Direct pressure    Wound exploration: wound explored through full range of motion      Wound extent: no muscle damage noted and no vascular damage noted      Contaminated: no    Treatment:     Area cleansed with:  Soap and water    Amount of cleaning:  Standard  Skin repair:     Repair method:  Sutures    Suture size:  4-0    Wound skin closure material used: Ethylene.    Suture  technique:  Simple interrupted    Number of sutures:  8  Approximation:     Approximation:  Close  Repair type:     Repair type:  Simple  Post-procedure details:     Dressing:  Non-adherent dressing    Procedure completion:  Tolerated well, no immediate complications  Labs Reviewed   COVID/FLU A&B PCR - Normal    Narrative:     The Xpert Xpress SARS-CoV-2/FLU/RSV plus is a rapid, multiplexed real-time PCR test intended for the simultaneous qualitative detection and differentiation of SARS-CoV-2, Influenza A, Influenza B, and respiratory syncytial virus (RSV) viral RNA in either nasopharyngeal swab or nasal swab specimens.                Imaging Results              CT Lumbar Spine Without Contrast (Preliminary result)  Result time 02/22/23 07:06:45      Preliminary result by Nolan Calderon Jr., MD (02/22/23 07:06:45)                   Narrative:    START OF REPORT:  TECHNIQUE: CT OF THE LUMBAR SPINE WAS PERFORMED WITHOUT INTRAVENOUS CONTRAST WITH DIRECT AXIAL AS WELL AS SAGITTAL AND CORONAL RECONSTRUCTION IMAGES.    COMPARISON: NONE.    CLINICAL HISTORY: TRAUMA; LOW BACK PAIN.    Findings:  Anatomy: Unremarkable.  Mineralization: Mild osteopenia is seen of the visualized bony structures.  Congenital: None.  Bone alignment: Unremarkable with no significant listhesis.  Curvature: The lumbar lordosis is maintained.  Bone and bone marrow: There is non-acute appearing mild loss of vertebral body height of L1.  Intervertebral disc spaces: The intervertebral discs are preserved throughout.  Osteophytes: There are small degenerative anterior osteophytes at L1 L2 L3 L4 L5.  Endplate Sclerosis: No endplate sclerosis is seen.  Facet degenerative changes: No facet degenerative changes are seen.  Spinal canal: Mild stenosis of the spinal canal is seen at the L2-L3 L3-L4 L4-L5 intervertebral disc level.  Fractures: No acute fracture dislocation or subluxation is seen.  Orthopedic Hardware: None.  Vertebral Fusion:  None.  T12- L1: Normal.  L1- 2: Normal.  L2- 3: Normal.  L3- 4: Normal.  L4- 5: Broad based disc bulge. Mild central canal stenosis and thecal sac compression. Mild lateral recess stenosis. Associated mass effect on the bilateral traversing nerve root.  L5- S1: Normal.  Visualized sacrum: Normal.  Miscellaneous: No other significant findings.      Impression:  1. There is non-acute appearing mild loss of vertebral body height of L1.  2. Degenerative changes and other findings as above. No displaced fracture, subluxation or dislocation identified.                          Preliminary result by Interface, Rad Results In (02/22/23 07:06:45)                   Narrative:    START OF REPORT:  TECHNIQUE: CT OF THE LUMBAR SPINE WAS PERFORMED WITHOUT INTRAVENOUS CONTRAST WITH DIRECT AXIAL AS WELL AS SAGITTAL AND CORONAL RECONSTRUCTION IMAGES.    COMPARISON: NONE.    CLINICAL HISTORY: TRAUMA; LOW BACK PAIN.    Findings:  Anatomy: Unremarkable.  Mineralization: Mild osteopenia is seen of the visualized bony structures.  Congenital: None.  Bone alignment: Unremarkable with no significant listhesis.  Curvature: The lumbar lordosis is maintained.  Bone and bone marrow: There is non-acute appearing mild loss of vertebral body height of L1.  Intervertebral disc spaces: The intervertebral discs are preserved throughout.  Osteophytes: There are small degenerative anterior osteophytes at L1 L2 L3 L4 L5.  Endplate Sclerosis: No endplate sclerosis is seen.  Facet degenerative changes: No facet degenerative changes are seen.  Spinal canal: Mild stenosis of the spinal canal is seen at the L2-L3 L3-L4 L4-L5 intervertebral disc level.  Fractures: No acute fracture dislocation or subluxation is seen.  Orthopedic Hardware: None.  Vertebral Fusion: None.  T12- L1: Normal.  L1- 2: Normal.  L2- 3: Normal.  L3- 4: Normal.  L4- 5: Broad based disc bulge. Mild central canal stenosis and thecal sac compression. Mild lateral recess stenosis.  Associated mass effect on the bilateral traversing nerve root.  L5- S1: Normal.  Visualized sacrum: Normal.  Miscellaneous: No other significant findings.      Impression:  1. There is non-acute appearing mild loss of vertebral body height of L1.  2. Degenerative changes and other findings as above. No displaced fracture, subluxation or dislocation identified.                                         CT Head Without Contrast (Preliminary result)  Result time 02/22/23 07:05:31      Preliminary result by Nolan Calderon Jr., MD (02/22/23 07:05:31)                   Narrative:    START OF REPORT:  TECHNIQUE: CT OF THE HEAD WAS PERFORMED WITHOUT INTRAVENOUS CONTRAST WITH AXIAL AS WELL AS CORONAL AND SAGITTAL IMAGES.    COMPARISON: NONE.    DOSAGE INFORMATION: AUTOMATED EXPOSURE CONTROL WAS UTILIZED.    CLINICAL HISTORY: NONE.    Findings:  Hemorrhage: No acute intracranial hemorrhage is seen.  CSF spaces: The ventricles, sulci and basal cisterns all appear mildly prominent consistent with global cerebral atrophy.  Brain parenchyma: Unremarkable with preservation of the grey white junction throughout. Mild microvascular change is seen in portions of the periventricular and deep white matter tracts.  Cerebellum: Unremarkable.  Vascular: Mild atheromatous calcification of the intracranial arteries is seen.  Sella and skull base: The sella appears to be within normal limits for age.  Cerebellopontine angles: Within normal limits.  Herniation: None.  Intracranial calcifications: Incidental note is made of bilateral choroid plexus calcification. Incidental note is made of some pineal region calcification. Incidental note is made of some calcification of the falx.  Calvarium: No acute linear or depressed skull fracture is seen.  Scalp: No scalp soft tissue swelling is seen.    Maxillofacial Structures:  Paranasal sinuses: There is some mucoperiosteal thickening right sphenoid sinuses. This is consistent with acute and on  chronic sinusitis. The rest of the paranasal sinuses appear clear.  Orbits: The orbits appear unremarkable.  Zygomatic arches: The zygomatic arches are intact and unremarkable.  Temporal bones and mastoids: The temporal bones and mastoids appear unremarkable.  TMJ: The mandibular condyles appear normally placed with respect to the mandibular fossa.  Nasal Bones: The nasal septum is midline. No displaced nasal bone fracture is seen.    Visualized upper cervical spine: The visualized cervical spine appears unremarkable.      Impression:  1. No acute intracranial traumatic injury identified. Details and findings as noted above.                          Preliminary result by Interface, Rad Results In (02/22/23 07:05:31)                   Narrative:    START OF REPORT:  TECHNIQUE: CT OF THE HEAD WAS PERFORMED WITHOUT INTRAVENOUS CONTRAST WITH AXIAL AS WELL AS CORONAL AND SAGITTAL IMAGES.    COMPARISON: NONE.    DOSAGE INFORMATION: AUTOMATED EXPOSURE CONTROL WAS UTILIZED.    CLINICAL HISTORY: NONE.    Findings:  Hemorrhage: No acute intracranial hemorrhage is seen.  CSF spaces: The ventricles, sulci and basal cisterns all appear mildly prominent consistent with global cerebral atrophy.  Brain parenchyma: Unremarkable with preservation of the grey white junction throughout. Mild microvascular change is seen in portions of the periventricular and deep white matter tracts.  Cerebellum: Unremarkable.  Vascular: Mild atheromatous calcification of the intracranial arteries is seen.  Sella and skull base: The sella appears to be within normal limits for age.  Cerebellopontine angles: Within normal limits.  Herniation: None.  Intracranial calcifications: Incidental note is made of bilateral choroid plexus calcification. Incidental note is made of some pineal region calcification. Incidental note is made of some calcification of the falx.  Calvarium: No acute linear or depressed skull fracture is seen.  Scalp: No scalp soft tissue  swelling is seen.    Maxillofacial Structures:  Paranasal sinuses: There is some mucoperiosteal thickening right sphenoid sinuses. This is consistent with acute and on chronic sinusitis. The rest of the paranasal sinuses appear clear.  Orbits: The orbits appear unremarkable.  Zygomatic arches: The zygomatic arches are intact and unremarkable.  Temporal bones and mastoids: The temporal bones and mastoids appear unremarkable.  TMJ: The mandibular condyles appear normally placed with respect to the mandibular fossa.  Nasal Bones: The nasal septum is midline. No displaced nasal bone fracture is seen.    Visualized upper cervical spine: The visualized cervical spine appears unremarkable.      Impression:  1. No acute intracranial traumatic injury identified. Details and findings as noted above.                                         Medications   LIDOcaine (PF) 10 mg/ml (1%) injection 50 mg (has no administration in time range)     Medical Decision Making:   Differential Diagnosis:   Laceration, fracture, contusion, head injury, viral syndrome, covid           ED Course as of 02/22/23 0652   Wed Feb 22, 2023   0547 Family states that she had an episode of vomiting 2 nights ago.  There people in the nursing home to have COVID and there really concerned that she may have COVID.  They are requesting a COVID swab.  Patient denies any active symptoms at this time.  She does have a chronic cough [BS]   0550 Transition of care at 6 AM. Dr. Manzano will assume care and determine appropriate treatment and care of this patient.   [BS]   0648 I am Dr. Manzano, I assumed the care of patient at 6:00 a.m., patient had the laceration of the right arm which was repaired by Dr. Everett, and they were mainly waiting for CT head and CT lumbar spine to be red and COVID test to come back because they were concerned that patient might have COVID.    Although studies are essentially negative for any acute abnormality, I will go ahead and  discharge her back to the nursing home. [GQ]      ED Course User Index  [BS] Castillo Everett MD  [GQ] Sushant Manzano MD                 Clinical Impression:   Final diagnoses:  [W19.XXXA] Fall, initial encounter (Primary)  [S41.111A] Arm laceration, right, initial encounter        ED Disposition Condition    Discharge Stable          ED Prescriptions    None       Follow-up Information       Follow up With Specialties Details Why Contact Info    Germain Garrett MD Family Medicine Schedule an appointment as soon as possible for a visit   [address] [address]  [phone]      [facility] - Emergency Dept Emergency Medicine Go to  If symptoms worsen [address] [facility] [id number] [phone]             Sushant Manzano MD  02/22/23 9455

## 2023-02-22 NOTE — DISCHARGE INSTRUCTIONS
Sutures may be removed in 5-7 days        Patient must be seen by patient's primary care M.D. for follow-up and further treatment as needed.     Inform patients Primary Care physician about the ER visit and need for follow up.    Get medicines and orders from the emergency room approved by patient's primary care M.D.    The exam and treatment you received in Emergency Room was for an urgent problem and NOT INTENDED AS COMPLETE CARE. It is important that you FOLLOW UP with a doctor for ongoing care. If your symptoms become WORSE or you DO NOT IMPROVE and you are unable to reach your health care provider, you should RETURN to the emergency department. The Emergency Room doctor has provided a PRELIMINARY INTERPRETATION of all your STUDIES. A final interpretation may be done after you are discharged. IF A CHANGE in your diagnosis or treatment is needed WE WILL CONTACT YOU. It is critical that we have a CURRENT PHONE NUMBER FOR YOU.

## 2023-03-07 ENCOUNTER — HOSPITAL ENCOUNTER (EMERGENCY)
Facility: HOSPITAL | Age: 78
Discharge: ANOTHER HOSPITAL | End: 2023-03-07
Attending: INTERNAL MEDICINE
Payer: MEDICARE

## 2023-03-07 ENCOUNTER — HOSPITAL ENCOUNTER (INPATIENT)
Facility: HOSPITAL | Age: 78
LOS: 1 days | Discharge: HOME | DRG: 083 | End: 2023-03-08
Attending: EMERGENCY MEDICINE | Admitting: STUDENT IN AN ORGANIZED HEALTH CARE EDUCATION/TRAINING PROGRAM
Payer: MEDICARE

## 2023-03-07 VITALS
TEMPERATURE: 98 F | SYSTOLIC BLOOD PRESSURE: 130 MMHG | RESPIRATION RATE: 18 BRPM | HEART RATE: 95 BPM | BODY MASS INDEX: 14.72 KG/M2 | WEIGHT: 75 LBS | DIASTOLIC BLOOD PRESSURE: 74 MMHG | OXYGEN SATURATION: 95 % | HEIGHT: 60 IN

## 2023-03-07 DIAGNOSIS — S52.532A CLOSED COLLES' FRACTURE OF LEFT RADIUS, INITIAL ENCOUNTER: Primary | ICD-10-CM

## 2023-03-07 DIAGNOSIS — Z01.818 PREOP EXAMINATION: ICD-10-CM

## 2023-03-07 DIAGNOSIS — S62.102A WRIST FRACTURE, CLOSED, LEFT, INITIAL ENCOUNTER: ICD-10-CM
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
## 2023-03-07 DIAGNOSIS — I60.9 SUBARACHNOID HEMORRHAGE: Primary | ICD-10-CM

## 2023-03-07 DIAGNOSIS — I60.9 SUBARACHNOID HEMORRHAGE: ICD-10-CM

## 2023-03-07 PROBLEM — J96.11 CHRONIC RESPIRATORY FAILURE WITH HYPOXIA: Status: ACTIVE | Noted: 2020-01-16

## 2023-03-07 PROBLEM — E43 SEVERE PROTEIN-CALORIE MALNUTRITION (GOMEZ: LESS THAN 60% OF STANDARD WEIGHT): Status: ACTIVE | Noted: 2020-06-10

## 2023-03-07 PROBLEM — I10 HYPERTENSION: Status: ACTIVE | Noted: 2023-03-07

## 2023-03-07 PROBLEM — D12.4 ADENOMATOUS POLYP OF DESCENDING COLON: Status: ACTIVE | Noted: 2019-08-12

## 2023-03-07 PROBLEM — J44.9 CHRONIC OBSTRUCTIVE PULMONARY DISEASE: Status: ACTIVE | Noted: 2023-03-07

## 2023-03-07 PROBLEM — R91.1 PULMONARY NODULE: Status: ACTIVE | Noted: 2019-12-18

## 2023-03-07 PROBLEM — Z91.89 AT RISK OF PRESSURE INJURY OF SKIN: Status: ACTIVE | Noted: 2023-03-07

## 2023-03-07 PROBLEM — F41.9 ANXIETY DISORDER: Status: ACTIVE | Noted: 2023-03-07

## 2023-03-07 LAB
ALBUMIN SERPL-MCNC: 2.9 G/DL (ref 3.4–4.8)
ALBUMIN/GLOB SERPL: 0.7 RATIO (ref 1.1–2)
ALP SERPL-CCNC: 127 UNIT/L (ref 40–150)
ALT SERPL-CCNC: 14 UNIT/L (ref 0–55)
APTT PPP: 33.4 SECONDS (ref 23.2–33.7)
AST SERPL-CCNC: 14 UNIT/L (ref 5–34)
BASOPHILS # BLD AUTO: 0.06 X10(3)/MCL (ref 0–0.2)
BASOPHILS NFR BLD AUTO: 0.5 %
BILIRUBIN DIRECT+TOT PNL SERPL-MCNC: 0.2 MG/DL
BUN SERPL-MCNC: 22 MG/DL (ref 9.8–20.1)
CALCIUM SERPL-MCNC: 9.5 MG/DL (ref 8.4–10.2)
CHLORIDE SERPL-SCNC: 100 MMOL/L (ref 98–107)
CO2 SERPL-SCNC: 34 MMOL/L (ref 23–31)
CREAT SERPL-MCNC: 0.53 MG/DL (ref 0.55–1.02)
EOSINOPHIL # BLD AUTO: 0.27 X10(3)/MCL (ref 0–0.9)
EOSINOPHIL NFR BLD AUTO: 2.1 %
ERYTHROCYTE [DISTWIDTH] IN BLOOD BY AUTOMATED COUNT: 14.6 % (ref 11.5–17)
GFR SERPLBLD CREATININE-BSD FMLA CKD-EPI: >60 MLS/MIN/1.73/M2
GLOBULIN SER-MCNC: 4.3 GM/DL (ref 2.4–3.5)
GLUCOSE SERPL-MCNC: 132 MG/DL (ref 82–115)
HCT VFR BLD AUTO: 35.9 % (ref 37–47)
HGB BLD-MCNC: 10.5 G/DL (ref 12–16)
IMM GRANULOCYTES # BLD AUTO: 0.06 X10(3)/MCL (ref 0–0.04)
IMM GRANULOCYTES NFR BLD AUTO: 0.5 %
INR BLD: 1.01 (ref 0–1.3)
LYMPHOCYTES # BLD AUTO: 0.97 X10(3)/MCL (ref 0.6–4.6)
LYMPHOCYTES NFR BLD AUTO: 7.6 %
MCH RBC QN AUTO: 26.4 PG
MCHC RBC AUTO-ENTMCNC: 29.2 G/DL (ref 33–36)
MCV RBC AUTO: 90.4 FL (ref 80–94)
MONOCYTES # BLD AUTO: 0.72 X10(3)/MCL (ref 0.1–1.3)
MONOCYTES NFR BLD AUTO: 5.7 %
NEUTROPHILS # BLD AUTO: 10.61 X10(3)/MCL (ref 2.1–9.2)
NEUTROPHILS NFR BLD AUTO: 83.6 %
PLATELET # BLD AUTO: 467 X10(3)/MCL (ref 130–400)
PMV BLD AUTO: 9.3 FL (ref 7.4–10.4)
POTASSIUM SERPL-SCNC: 4.2 MMOL/L (ref 3.5–5.1)
PROT SERPL-MCNC: 7.2 GM/DL (ref 5.8–7.6)
PROTHROMBIN TIME: 13.6 SECONDS (ref 12.5–14.5)
RBC # BLD AUTO: 3.97 X10(6)/MCL (ref 4.2–5.4)
SODIUM SERPL-SCNC: 142 MMOL/L (ref 136–145)
WBC # SPEC AUTO: 12.7 X10(3)/MCL (ref 4.5–11.5)

## 2023-03-07 PROCEDURE — 93010 ELECTROCARDIOGRAM REPORT: CPT | Mod: ,,, | Performed by: INTERNAL MEDICINE

## 2023-03-07 PROCEDURE — 99285 EMERGENCY DEPT VISIT HI MDM: CPT | Mod: 25,27

## 2023-03-07 PROCEDURE — 85610 PROTHROMBIN TIME: CPT | Performed by: INTERNAL MEDICINE

## 2023-03-07 PROCEDURE — 25000003 PHARM REV CODE 250: Performed by: INTERNAL MEDICINE

## 2023-03-07 PROCEDURE — 99285 EMERGENCY DEPT VISIT HI MDM: CPT | Mod: 25

## 2023-03-07 PROCEDURE — 96375 TX/PRO/DX INJ NEW DRUG ADDON: CPT

## 2023-03-07 PROCEDURE — 80053 COMPREHEN METABOLIC PANEL: CPT | Performed by: INTERNAL MEDICINE

## 2023-03-07 PROCEDURE — 85025 COMPLETE CBC W/AUTO DIFF WBC: CPT | Performed by: INTERNAL MEDICINE

## 2023-03-07 PROCEDURE — 96374 THER/PROPH/DIAG INJ IV PUSH: CPT

## 2023-03-07 PROCEDURE — 63600175 PHARM REV CODE 636 W HCPCS: Performed by: INTERNAL MEDICINE

## 2023-03-07 PROCEDURE — 85730 THROMBOPLASTIN TIME PARTIAL: CPT | Performed by: INTERNAL MEDICINE

## 2023-03-07 PROCEDURE — 93005 ELECTROCARDIOGRAM TRACING: CPT

## 2023-03-07 PROCEDURE — 29105 APPLICATION LONG ARM SPLINT: CPT

## 2023-03-07 PROCEDURE — 11000001 HC ACUTE MED/SURG PRIVATE ROOM

## 2023-03-07 PROCEDURE — 93010 EKG 12-LEAD: ICD-10-PCS | Mod: ,,, | Performed by: INTERNAL MEDICINE

## 2023-03-07 RX ORDER — ONDANSETRON 2 MG/ML
4 INJECTION INTRAMUSCULAR; INTRAVENOUS
Status: COMPLETED | OUTPATIENT
Start: 2023-03-07 | End: 2023-03-07

## 2023-03-07 RX ORDER — SODIUM CHLORIDE 9 MG/ML
1000 INJECTION, SOLUTION INTRAVENOUS
Status: COMPLETED | OUTPATIENT
Start: 2023-03-07 | End: 2023-03-07

## 2023-03-07 RX ORDER — MORPHINE SULFATE 4 MG/ML
2 INJECTION, SOLUTION INTRAMUSCULAR; INTRAVENOUS
Status: COMPLETED | OUTPATIENT
Start: 2023-03-07 | End: 2023-03-07

## 2023-03-07 RX ORDER — LEVETIRACETAM 500 MG/5ML
1000 INJECTION, SOLUTION, CONCENTRATE INTRAVENOUS
Status: COMPLETED | OUTPATIENT
Start: 2023-03-07 | End: 2023-03-07

## 2023-03-07 RX ADMIN — SODIUM CHLORIDE 1000 ML: 9 INJECTION, SOLUTION INTRAVENOUS at 07:03

## 2023-03-07 RX ADMIN — MORPHINE SULFATE 2 MG: 4 INJECTION, SOLUTION INTRAMUSCULAR; INTRAVENOUS at 07:03

## 2023-03-07 RX ADMIN — ONDANSETRON 4 MG: 2 INJECTION INTRAMUSCULAR; INTRAVENOUS at 07:03

## 2023-03-07 RX ADMIN — LEVETIRACETAM 1000 MG: 100 INJECTION, SOLUTION INTRAVENOUS at 07:03

## 2023-03-08 VITALS
TEMPERATURE: 98 F | HEIGHT: 60 IN | SYSTOLIC BLOOD PRESSURE: 112 MMHG | DIASTOLIC BLOOD PRESSURE: 69 MMHG | WEIGHT: 84 LBS | RESPIRATION RATE: 17 BRPM | OXYGEN SATURATION: 92 % | HEART RATE: 88 BPM | BODY MASS INDEX: 16.49 KG/M2

## 2023-03-08 PROBLEM — S52.532A CLOSED COLLES' FRACTURE OF LEFT RADIUS: Status: ACTIVE | Noted: 2023-03-08

## 2023-03-08 LAB
ALBUMIN SERPL-MCNC: 2.4 G/DL (ref 3.4–4.8)
ALBUMIN/GLOB SERPL: 0.8 RATIO (ref 1.1–2)
ALP SERPL-CCNC: 112 UNIT/L (ref 40–150)
ALT SERPL-CCNC: 10 UNIT/L (ref 0–55)
AST SERPL-CCNC: 16 UNIT/L (ref 5–34)
BILIRUBIN DIRECT+TOT PNL SERPL-MCNC: 0.1 MG/DL
BUN SERPL-MCNC: 12.3 MG/DL (ref 9.8–20.1)
CALCIUM SERPL-MCNC: 7.5 MG/DL (ref 8.4–10.2)
CHLORIDE SERPL-SCNC: 106 MMOL/L (ref 98–107)
CO2 SERPL-SCNC: 26 MMOL/L (ref 23–31)
CREAT SERPL-MCNC: 0.4 MG/DL (ref 0.55–1.02)
GFR SERPLBLD CREATININE-BSD FMLA CKD-EPI: >60 MLS/MIN/1.73/M2
GLOBULIN SER-MCNC: 2.9 GM/DL (ref 2.4–3.5)
GLUCOSE SERPL-MCNC: 95 MG/DL (ref 82–115)
LACTATE SERPL-SCNC: 0.5 MMOL/L (ref 0.5–2.2)
LACTATE SERPL-SCNC: 0.6 MMOL/L (ref 0.5–2.2)
LACTATE SERPL-SCNC: 0.8 MMOL/L (ref 0.5–2.2)
LACTATE SERPL-SCNC: 1 MMOL/L (ref 0.5–2.2)
POTASSIUM SERPL-SCNC: 4 MMOL/L (ref 3.5–5.1)
PROT SERPL-MCNC: 5.3 GM/DL (ref 5.8–7.6)
SODIUM SERPL-SCNC: 141 MMOL/L (ref 136–145)

## 2023-03-08 PROCEDURE — 97166 OT EVAL MOD COMPLEX 45 MIN: CPT

## 2023-03-08 PROCEDURE — 83605 ASSAY OF LACTIC ACID: CPT | Performed by: STUDENT IN AN ORGANIZED HEALTH CARE EDUCATION/TRAINING PROGRAM

## 2023-03-08 PROCEDURE — 25000003 PHARM REV CODE 250: Performed by: STUDENT IN AN ORGANIZED HEALTH CARE EDUCATION/TRAINING PROGRAM

## 2023-03-08 PROCEDURE — 99223 1ST HOSP IP/OBS HIGH 75: CPT | Mod: ,,, | Performed by: NURSE PRACTITIONER

## 2023-03-08 PROCEDURE — 99223 PR INITIAL HOSPITAL CARE,LEVL III: ICD-10-PCS | Mod: ,,, | Performed by: NURSE PRACTITIONER

## 2023-03-08 PROCEDURE — 80053 COMPREHEN METABOLIC PANEL: CPT | Performed by: STUDENT IN AN ORGANIZED HEALTH CARE EDUCATION/TRAINING PROGRAM

## 2023-03-08 PROCEDURE — 97162 PT EVAL MOD COMPLEX 30 MIN: CPT

## 2023-03-08 RX ORDER — SODIUM CHLORIDE 9 MG/ML
INJECTION, SOLUTION INTRAVENOUS CONTINUOUS
Status: DISCONTINUED | OUTPATIENT
Start: 2023-03-08 | End: 2023-03-08 | Stop reason: HOSPADM

## 2023-03-08 RX ORDER — POTASSIUM CHLORIDE 20 MEQ/1
20 TABLET, EXTENDED RELEASE ORAL 2 TIMES DAILY
COMMUNITY
Start: 2023-02-22

## 2023-03-08 RX ORDER — FAMOTIDINE 20 MG/1
20 TABLET, FILM COATED ORAL 2 TIMES DAILY
Status: DISCONTINUED | OUTPATIENT
Start: 2023-03-08 | End: 2023-03-08 | Stop reason: HOSPADM

## 2023-03-08 RX ORDER — MORPHINE SULFATE 4 MG/ML
2 INJECTION, SOLUTION INTRAMUSCULAR; INTRAVENOUS
Status: DISCONTINUED | OUTPATIENT
Start: 2023-03-08 | End: 2023-03-08 | Stop reason: HOSPADM

## 2023-03-08 RX ORDER — ENOXAPARIN SODIUM 100 MG/ML
40 INJECTION SUBCUTANEOUS EVERY 12 HOURS
Status: DISCONTINUED | OUTPATIENT
Start: 2023-03-08 | End: 2023-03-08 | Stop reason: HOSPADM

## 2023-03-08 RX ORDER — TRAMADOL HYDROCHLORIDE 50 MG/1
50 TABLET ORAL EVERY 6 HOURS PRN
Qty: 12 TABLET | Refills: 0 | Status: SHIPPED | OUTPATIENT
Start: 2023-03-08

## 2023-03-08 RX ORDER — FLUTICASONE FUROATE AND VILANTEROL TRIFENATATE 200; 25 UG/1; UG/1
1 POWDER RESPIRATORY (INHALATION) DAILY
COMMUNITY
Start: 2023-02-08

## 2023-03-08 RX ORDER — OXYCODONE HYDROCHLORIDE 5 MG/1
5 TABLET ORAL EVERY 4 HOURS PRN
Status: DISCONTINUED | OUTPATIENT
Start: 2023-03-08 | End: 2023-03-08 | Stop reason: HOSPADM

## 2023-03-08 RX ORDER — ALPRAZOLAM 0.25 MG/1
0.25 TABLET ORAL DAILY PRN
COMMUNITY
Start: 2023-03-02 | End: 2023-04-17

## 2023-03-08 RX ORDER — GABAPENTIN 300 MG/1
300 CAPSULE ORAL 3 TIMES DAILY
COMMUNITY
Start: 2023-02-20

## 2023-03-08 RX ORDER — LEVETIRACETAM 500 MG/1
500 TABLET ORAL 2 TIMES DAILY
Status: DISCONTINUED | OUTPATIENT
Start: 2023-03-08 | End: 2023-03-08 | Stop reason: HOSPADM

## 2023-03-08 RX ORDER — DOCUSATE SODIUM 100 MG/1
100 CAPSULE, LIQUID FILLED ORAL 2 TIMES DAILY
Status: DISCONTINUED | OUTPATIENT
Start: 2023-03-08 | End: 2023-03-08 | Stop reason: HOSPADM

## 2023-03-08 RX ORDER — TALC
6 POWDER (GRAM) TOPICAL NIGHTLY PRN
Status: DISCONTINUED | OUTPATIENT
Start: 2023-03-08 | End: 2023-03-08 | Stop reason: HOSPADM

## 2023-03-08 RX ORDER — METHOCARBAMOL 750 MG/1
750 TABLET, FILM COATED ORAL 3 TIMES DAILY
Status: DISCONTINUED | OUTPATIENT
Start: 2023-03-08 | End: 2023-03-08 | Stop reason: HOSPADM

## 2023-03-08 RX ORDER — ALPRAZOLAM 0.5 MG/1
0.5 TABLET ORAL 4 TIMES DAILY PRN
COMMUNITY
Start: 2023-03-02

## 2023-03-08 RX ORDER — ALBUTEROL SULFATE 90 UG/1
2 AEROSOL, METERED RESPIRATORY (INHALATION)
COMMUNITY
Start: 2023-01-27

## 2023-03-08 RX ORDER — AMLODIPINE BESYLATE 5 MG/1
5 TABLET ORAL DAILY
COMMUNITY
Start: 2023-02-27

## 2023-03-08 RX ORDER — POLYETHYLENE GLYCOL 3350 17 G/17G
17 POWDER, FOR SOLUTION ORAL 2 TIMES DAILY
Status: DISCONTINUED | OUTPATIENT
Start: 2023-03-08 | End: 2023-03-08 | Stop reason: HOSPADM

## 2023-03-08 RX ORDER — BISACODYL 10 MG
10 SUPPOSITORY, RECTAL RECTAL DAILY PRN
Status: DISCONTINUED | OUTPATIENT
Start: 2023-03-08 | End: 2023-03-08 | Stop reason: HOSPADM

## 2023-03-08 RX ORDER — SERTRALINE HYDROCHLORIDE 50 MG/1
50 TABLET, FILM COATED ORAL DAILY
COMMUNITY
Start: 2023-02-15

## 2023-03-08 RX ORDER — LEVETIRACETAM 500 MG/1
500 TABLET ORAL 2 TIMES DAILY
Qty: 12 TABLET | Refills: 0 | Status: SHIPPED | OUTPATIENT
Start: 2023-03-08 | End: 2023-03-14

## 2023-03-08 RX ORDER — ACETAMINOPHEN 325 MG/1
650 TABLET ORAL EVERY 4 HOURS
Status: DISCONTINUED | OUTPATIENT
Start: 2023-03-08 | End: 2023-03-08 | Stop reason: HOSPADM

## 2023-03-08 RX ORDER — ALENDRONATE SODIUM 70 MG/1
70 TABLET ORAL
COMMUNITY
Start: 2023-02-27

## 2023-03-08 RX ORDER — ATORVASTATIN CALCIUM 10 MG/1
10 TABLET, FILM COATED ORAL DAILY
COMMUNITY
Start: 2023-02-09

## 2023-03-08 RX ADMIN — SODIUM CHLORIDE: 9 INJECTION, SOLUTION INTRAVENOUS at 07:03

## 2023-03-08 RX ADMIN — ACETAMINOPHEN 325MG 650 MG: 325 TABLET ORAL at 09:03

## 2023-03-08 RX ADMIN — POLYETHYLENE GLYCOL 3350 17 G: 17 POWDER, FOR SOLUTION ORAL at 08:03

## 2023-03-08 RX ADMIN — LEVETIRACETAM 500 MG: 500 TABLET, FILM COATED ORAL at 08:03

## 2023-03-08 RX ADMIN — SODIUM CHLORIDE: 9 INJECTION, SOLUTION INTRAVENOUS at 01:03

## 2023-03-08 RX ADMIN — DOCUSATE SODIUM 100 MG: 100 CAPSULE, LIQUID FILLED ORAL at 01:03

## 2023-03-08 RX ADMIN — FAMOTIDINE 20 MG: 20 TABLET, FILM COATED ORAL at 01:03

## 2023-03-08 RX ADMIN — LEVETIRACETAM 500 MG: 500 TABLET, FILM COATED ORAL at 01:03

## 2023-03-08 RX ADMIN — ACETAMINOPHEN 325MG 650 MG: 325 TABLET ORAL at 06:03

## 2023-03-08 RX ADMIN — FAMOTIDINE 20 MG: 20 TABLET, FILM COATED ORAL at 08:03

## 2023-03-08 RX ADMIN — ACETAMINOPHEN 325MG 650 MG: 325 TABLET ORAL at 01:03

## 2023-03-08 RX ADMIN — DOCUSATE SODIUM 100 MG: 100 CAPSULE, LIQUID FILLED ORAL at 08:03

## 2023-03-08 RX ADMIN — METHOCARBAMOL 750 MG: 750 TABLET ORAL at 08:03

## 2023-03-08 RX ADMIN — POLYETHYLENE GLYCOL 3350 17 G: 17 POWDER, FOR SOLUTION ORAL at 01:03

## 2023-03-08 NOTE — PLAN OF CARE
Problem: Physical Therapy  Goal: Physical Therapy Goal  Description: Goals to be met by: 23     Patient will increase functional independence with mobility by performin. Supine to sit with Stand-by Assistance  2. Sit to supine with Stand-by Assistance  3. Sit to stand transfer with Stand-by Assistance  4. Gait  x 150 feet with Stand-by Assistance using Quad Cane vs LRAD.     Outcome: Ongoing, Progressing

## 2023-03-08 NOTE — HOSPITAL COURSE
77-year-old female presented status post fall nursing home.  She was found to have a left forearm fracture as well as a head bleed.  Her repeat CT this morning showed resolution of the head bleed.  Orthopedics recommended operative intervention as an inpatient or outpatient.  After further discussion with them we will discharge the patient back to nursing home she can follow up their clinic to have elective left forearm procedure.  The patient was set are aware of the plan and agreeable.  Case management has a range with a nursing home to return to the clinic.  She will be discharged with a short course of Ultram and completion of her for Keppra dosing.  She can call [facility] for any questions.

## 2023-03-08 NOTE — ED PROVIDER NOTES
Encounter Date: 3/7/2023       History     Chief Complaint   Patient presents with    Wrist Injury     BIBA from NH for fall with c/o L wrist pain    bumped her head   no LOC     See MDM    The history is provided by the patient and the EMS personnel. No  was used.     Patient says that the she was going to put something in the refrigerator, she is on oxygen and the when she turned around she tripped on the oxygen tank and fell.    Review of patient's allergies indicates:   Allergen Reactions    Levofloxacin Rash   No current outpatient medications    Past Medical History:   Diagnosis Date    Anemia, unspecified     Anxiety disorder, unspecified     COPD (chronic obstructive pulmonary disease)     GERD (gastroesophageal reflux disease)     Hypertension      History reviewed. No pertinent surgical history.  History reviewed. No pertinent family history.  Social History     Tobacco Use    Smoking status: Unknown   Substance Use Topics    Alcohol use: Not Currently    Drug use: Never     Review of Systems   Constitutional:  Negative for fever.   Respiratory:  Negative for cough and shortness of breath.    Cardiovascular:  Negative for chest pain.   Gastrointestinal:  Negative for abdominal pain.   Genitourinary:  Negative for difficulty urinating and dysuria.   Musculoskeletal:  Negative for gait problem.   Skin:  Negative for color change.   Neurological:  Negative for dizziness, speech difficulty and headaches.   Psychiatric/Behavioral:  Negative for hallucinations and suicidal ideas.    All other systems reviewed and are negative.    Physical Exam     Initial Vitals [03/07/23 1809]   BP Pulse Resp Temp SpO2   130/74 104 (!) 22 98 °F (36.7 °C) (!) 94 %      MAP       --         Physical Exam    Nursing note and vitals reviewed.  Constitutional: She appears well-developed and well-nourished.   HENT:   Head: Normocephalic.   Eyes: EOM are normal.   Neck:   Normal range of motion.  Cardiovascular:   Normal rate, regular rhythm, normal heart sounds and intact distal pulses.           Pulmonary/Chest: Breath sounds normal. No respiratory distress.   Abdominal: Abdomen is soft. Bowel sounds are normal. There is no abdominal tenderness.   Musculoskeletal:         General: Normal range of motion.      Cervical back: Normal range of motion.      Comments: Tenderness to right wrist     Neurological: She is alert and oriented to person, place, and time. She has normal strength.   Skin: Skin is warm and dry.   Psychiatric: She has a normal mood and affect. Her behavior is normal. Judgment and thought content normal.       ED Course   Procedures    Orders Placed This Encounter   Procedures    CT Head Without Contrast    CT Cervical Spine Without Contrast    X-Ray Wrist Complete Left    X-Ray Chest 1 View    CBC auto differential    Comprehensive metabolic panel    APTT    Protime-INR    CBC with Differential    EKG 12-lead    Insert Saline lock IV    PFC Facilitated Request      Medications   0.9%  NaCl infusion (has no administration in time range)   morphine injection 2 mg (2 mg Intravenous Given 3/7/23 1919)   ondansetron injection 4 mg (4 mg Intravenous Given 3/7/23 1921)   levETIRAcetam injection 1,000 mg (1,000 mg Intravenous Given 3/7/23 1919)       Admission on 03/07/2023   Component Date Value Ref Range Status    WBC 03/07/2023 12.7 (H)  4.5 - 11.5 x10(3)/mcL Final    RBC 03/07/2023 3.97 (L)  4.20 - 5.40 x10(6)/mcL Final    Hgb 03/07/2023 10.5 (L)  12.0 - 16.0 g/dL Final    Hct 03/07/2023 35.9 (L)  37.0 - 47.0 % Final    MCV 03/07/2023 90.4  80.0 - 94.0 fL Final    MCH 03/07/2023 26.4  pg Final    MCHC 03/07/2023 29.2 (L)  33.0 - 36.0 g/dL Final    RDW 03/07/2023 14.6  11.5 - 17.0 % Final    Platelet 03/07/2023 467 (H)  130 - 400 x10(3)/mcL Final    MPV 03/07/2023 9.3  7.4 - 10.4 fL Final    Neut % 03/07/2023 83.6  % Final    Lymph % 03/07/2023 7.6  % Final    Mono % 03/07/2023 5.7  % Final    Eos % 03/07/2023  2.1  % Final    Basophil % 03/07/2023 0.5  % Final    Lymph # 03/07/2023 0.97  0.6 - 4.6 x10(3)/mcL Final    Neut # 03/07/2023 10.61 (H)  2.1 - 9.2 x10(3)/mcL Final    Mono # 03/07/2023 0.72  0.1 - 1.3 x10(3)/mcL Final    Eos # 03/07/2023 0.27  0 - 0.9 x10(3)/mcL Final    Baso # 03/07/2023 0.06  0 - 0.2 x10(3)/mcL Final    IG# 03/07/2023 0.06 (H)  0 - 0.04 x10(3)/mcL Final    IG% 03/07/2023 0.5  % Final       Labs Reviewed   CBC WITH DIFFERENTIAL - Abnormal; Notable for the following components:       Result Value    WBC 12.7 (*)     RBC 3.97 (*)     Hgb 10.5 (*)     Hct 35.9 (*)     MCHC 29.2 (*)     Platelet 467 (*)     Neut # 10.61 (*)     IG# 0.06 (*)     All other components within normal limits   CBC W/ AUTO DIFFERENTIAL    Narrative:     The following orders were created for panel order CBC auto differential.  Procedure                               Abnormality         Status                     ---------                               -----------         ------                     CBC with Differential[[id number]]        Abnormal            Final result                 Please view results for these tests on the individual orders.   COMPREHENSIVE METABOLIC PANEL   APTT   PROTIME-INR          Imaging Results              CT Head Without Contrast (Final result)  Result time 03/07/23 18:59:57      Final result by Armando Desai MD (03/07/23 18:59:57)                   Impression:    Impression:  1.  Small amount of subarachnoid hemorrhage layering in the right frontal sulcus.    2. No fractures are identified in the visualized osseous structures.  2. Scattered white matter microvascular ischemic changes.  3. Diffuse involutional changes.    These findings were reported to Dr. Manzano  in the emergency department at the 16:59 hours on 03/07/2023.      Electronically signed by: Armando Desai MD  Date:    03/07/2023  Time:    18:59               Narrative:      CT HEAD WITHOUT CONTRAST:    CPT 05870    Total DLP:    mGy-cm  Automatic exposure control was utilized to limit the radiation dose to the patient.    History: Status post fall with blunt trauma to head complaining of pain.      Comparison: 02/23/2023.      Technique: Multiple contiguous axial images were acquired from the base of the skull the vertex without contrast administration.    Findings:  There is trace subtly hyperdense subarachnoid hemorrhage in a sulcus of the right frontal lobe on series 7, images 22-23 and series 3, image 21.  There are diffuse cerebral and cerebellar parenchymal involutional changes with resultant prominence of the ventricles and basal cisterns. There is scattered asymmetric low density without obvious mass-effect throughout the bilateral supratentorial white matter. The gray-white junctions are maintained. No other cerebral or cerebellar parenchymal abnormality is identified. There is no midline shift or significant mass effect.  There are calcifications of the distal internal carotid and vertebrobasilar arteries. The partially visualized paranasal sinuses and mastoid air cells are clear. The calvarium is intact.                                       CT Cervical Spine Without Contrast (Final result)  Result time 03/07/23 19:01:30      Final result by Armando Desai MD (03/07/23 19:01:30)                   Impression:      1. No acute fractures or subluxations are identified.    2.  Changes from cervical spondylosis are poorly visualized, as above.    3.  Osteopenia versus osteoporosis.    4.  Severe bullous emphysematous changes in the partially visualized lung apices.      Electronically signed by: Armando Desai MD  Date:    03/07/2023  Time:    19:01               Narrative:      CT SCAN OF CERVICAL SPINE WITH 3-D POST PROCESSING:    CPT: 15709, 03083    Total DLP: 11 16 mGy-cm  Automatic exposure control was utilized to limit the radiation dose to the patient.    HISTORY:  Neck pain after trauma.    Comparison:    TECHNIQUE:  Multiple thin cut axial images were acquired through the cervical spine without contrast administration. 3D postprocessing and multiplanar reformatting was performed on the source images at the workstation by the radiologist.    FINDINGS:  The cervical vertebral body heights and alignment of the cervical vertebra in the AP plane are well maintained.  No acute fractures, subluxations, jumped or perched facets, prevertebral soft tissue swelling, or radiopaque foreign bodies are present.  Evaluation of the margins of the soft tissue components of the spinal canal is limited due to artifact.  Significant canal stenosis and significant disc bulges, protrusions, herniations/extrusions or canal stenoses cannot be excluded due to the artifact. There are bilateral foraminal stenosis due to uncovertebral hypertrophy and facet arthropathy.  There is a mild dextroscoliosis.  The osseous structures are diffusely demineralized.  There are severe bullous emphysematous changes in the lung apices.                                       X-Ray Chest 1 View (Preliminary result)  Result time 03/07/23 19:12:17      Wet Read by Sushant Manzano MD (03/07/23 19:12:17, [facility] - Emergency Dept, Emergency Medicine)    Chest One View:  Independently reviewed and/or interpreted by Sushant Manzano MD.  No Focal Consolidation, chronic bilateral interstitial markings                                     X-Ray Wrist Complete Left (Preliminary result)  Result time 03/07/23 19:11:37   Procedure changed from X-Ray Wrist Complete Right     Wet Read by Sushant Manzano MD (03/07/23 19:11:37, [facility] - Emergency Dept, Emergency Medicine)    X-Ray, Independently Interpreted by Sushant Manzano MD.  Left wrist three views:  Distal radioulnar fracture slightly displaced                                     Medications   0.9%  NaCl infusion (has no administration in time range)   morphine injection 2 mg (2 mg Intravenous Given  3/7/23 1919)   ondansetron injection 4 mg (4 mg Intravenous Given 3/7/23 1921)   levETIRAcetam injection 1,000 mg (1,000 mg Intravenous Given 3/7/23 1919)     Medical Decision Making:   History:   I obtained history from: someone other than patient and EMS provider.  Initial Assessment:   Historian:  Patient.  Patient is a 77 years old female  that presents with fall in the nursing home Patient treatment prior to arrival. Risk factors include age and unsteady 8. Other history pertaining to this complaint included starting an IV line..   Assessment:  See physical exam.    Clinical Tests:   Lab Tests: Ordered  Radiological Study: Ordered and Reviewed  ED Management:  History was obtained.  Physical was performed.  Independent review of outside records:         I am Dr. Manzano, I assumed the care of patient after initial workup was started by nurse practitioner           ED Course as of 03/07/23 1944 Tue Mar 07, 2023   1915 Patient's CT head shows that patient has a subarachnoid hemorrhage, she has a fracture of the left distal wrist, I have splinted the radioulnar, I am giving her Zofran and morphine for pain and the Keppra 1 g IV piggyback for CA prophylaxis.  I will try to transfer her to a place where she can be seen by Neurosurgery and Orthopedics [GQ]   1921 I have applied leatherette wrist splint on her left wrist, post application neurovascular intact. [GQ]   1939 Talked to Dr. Kory Delgado at [facility] is OK with transfer.   [GQ]   1943 Patient says she tripped on her oxygen tank [GQ]      ED Course User Index  [GQ] Sushant Manzano MD                 Clinical Impression:   Final diagnoses:  [W19.XXXA] Fall  [Z01.818] Preop examination  [I60.9] Subarachnoid hemorrhage (Primary)  [S62.102A] Wrist fracture, closed, left, initial encounter - Minimally displaced        ED Disposition Condition    Transfer to Another Facility Stable                Sushant Manzano MD  03/07/23 1944

## 2023-03-08 NOTE — PLAN OF CARE
No changes overnight.  CT head this morning he is unremarkable.  Patient was oriented to person, place, events.  She does tell me she lives at home alone but nursing tells me she was from a nursing home.  She does have some level of dementia although she was able to provide the majority of her past medical history accurately.  She complains only of left forearm pain.  Denies headache.  States she rolled out of bed.  She was on home oxygen which she is on here.  We are awaiting official and final orthopedic recommendations.  We will like to start Lovenox today if cleared by Neurosurgery.  We will have a full tertiary exam tomorrow but no new findings on physical exam today.

## 2023-03-08 NOTE — CONSULTS
[facility] - [facility]  Orthopedics  Consult Note    Patient Name: Kylie Almonte  MRN: [id number]  Admission Date: 3/7/2023  Hospital Length of Stay: 1 days  Attending Provider: Guille Reddy MD  Primary Care Provider: Germain Garrett MD    Patient information was obtained from patient and ER records.     Consults  Subjective:     Principal Problem:<principal problem not specified>    Chief Complaint:   Chief Complaint   Patient presents with    Transfer     Transfer from [facility] er. Pt tripped/fell at nh. Left wrist fx, Sah. [id number]         HPI:  The patient is a 77-year-old female who tripped and fell while attempting to get something out of the refrigerator.  She was brought to at outside hospital and then transferred to [facility] Emergency Room with a subdural hematoma as well as a left distal radius fracture.  She has been admitted to trauma surgery service.  Neurosurgery is on the case.  Orthopedics has been consulted to manage her left upper extremity injury.  She is in a sugar-tong splint.  Her pain is controlled.  She denies any other orthopedic injuries.    Past Medical History:   Diagnosis Date    Anemia, unspecified     Anxiety disorder, unspecified     COPD (chronic obstructive pulmonary disease)     GERD (gastroesophageal reflux disease)     Hypertension        No past surgical history on file.    Review of patient's allergies indicates:   Allergen Reactions    Levofloxacin Rash       Current Facility-Administered Medications   Medication    0.9%  NaCl infusion    acetaminophen tablet 650 mg    bisacodyL suppository 10 mg    docusate sodium capsule 100 mg    enoxaparin injection 40 mg    famotidine tablet 20 mg    levETIRAcetam tablet 500 mg    melatonin tablet 6 mg    methocarbamoL tablet 750 mg    morphine injection 2 mg    oxyCODONE immediate release tablet 5 mg    polyethylene glycol packet 17 g     Family History    None       Tobacco Use    Smoking status: Unknown     Smokeless tobacco: Not on file   Substance and Sexual Activity    Alcohol use: Not Currently    Drug use: Never    Sexual activity: Not on file     Review of Systems   Constitutional: Negative for chills and fever.   HENT:  Negative for congestion and hearing loss.    Eyes:  Negative for visual disturbance.   Cardiovascular:  Negative for chest pain and syncope.   Respiratory:  Negative for cough and shortness of breath.    Hematologic/Lymphatic: Does not bruise/bleed easily.   Skin:  Negative for color change and rash.   Gastrointestinal:  Negative for abdominal pain, nausea and vomiting.   Genitourinary:  Negative for dysuria and hematuria.   Neurological:  Negative for numbness, sensory change and weakness.   Psychiatric/Behavioral:  Negative for altered mental status.    Objective:     Vital Signs (Most Recent):  Temp: 97.7 °F (36.5 °C) (03/07/23 2101)  Pulse: 87 (03/08/23 0814)  Resp: 17 (03/08/23 0814)  BP: 112/66 (03/08/23 0801)  SpO2: 97 % (03/08/23 0814) Vital Signs (24h Range):  Temp:  [97.7 °F (36.5 °C)-98 °F (36.7 °C)] 97.7 °F (36.5 °C)  Pulse:  [] 87  Resp:  [17-28] 17  SpO2:  [94 %-100 %] 97 %  BP: (107-130)/(63-74) 112/66           There is no height or weight on file to calculate BMI.    No intake or output data in the 24 hours ending 03/08/23 1213    General    Constitutional: She is oriented to person, place, and time. She appears well-developed and well-nourished. No distress.   HENT:   Head: Normocephalic.   Eyes: EOM are normal.   Neck: Neck supple.   Cardiovascular:  Normal rate and intact distal pulses.            Pulmonary/Chest: Effort normal. No respiratory distress.   Abdominal: Soft. She exhibits no distension. There is no abdominal tenderness.   Neurological: She is alert and oriented to person, place, and time.         Left upper extremity: Sugar-tong splint is in place.  Compartments are soft.  She can flex and extend all of the digit.  Palpable radial pulse.  Brisk capillary  refill distally.  Sensation to light touch intact distally.  No tenderness or deformity over the upper arm and no pain with shoulder range of motion.      Other extremities are free from any obvious swelling, deformity, open wounds, tenderness over the long bones, pain with range of motion.    Significant Labs: BMP:   Recent Labs   Lab 03/08/23  0441      K 4.0   CO2 26   BUN 12.3   CREATININE 0.40*   CALCIUM 7.5*     CBC:   Recent Labs   Lab 03/07/23  1928   WBC 12.7*   HGB 10.5*   HCT 35.9*   *     All pertinent labs within the past 24 hours have been reviewed.    Significant Imaging: X-Ray: I have reviewed all pertinent results/findings and my personal findings are:  Three-view x-ray left wrist demonstrates a displaced distal radius fracture    Assessment/Plan:     Active Diagnoses:    Diagnosis Date Noted POA    Closed Colles' fracture of left radius [S52.532A] 03/08/2023 Unknown      Problems Resolved During this Admission:     Patient had a fall resulting in a displaced left distal radius fracture.  She is currently stable in her sugar-tong splint.  Her injury would benefit from operative stabilization when she is medically stable for the operating room.  We could potentially perform her surgery while she is inpatient, verses as an outpatient in the next 1-2 weeks.  Continue splint, elevation, and nonweightbearing to the left upper extremity until surgery.    The above findings, diagnosis, and treatment plan were discussed with Dr. Yfn Monsivais who is in agreement.    Thank you for your consult.       Mari Laguna, JONI  Orthopedics  [facility]

## 2023-03-08 NOTE — PLAN OF CARE
Problem: Occupational Therapy  Goal: Occupational Therapy Goal  Description: Goals to be met by: 3/22/2023     Patient will increase functional independence with ADLs by performing:    UE Dressing with Modified [place].  LE Dressing with Modified [place].  Grooming while standing with Modified [place].  Toileting from toilet with Modified [place] for hygiene and clothing management.   Toilet transfer to toilet with Modified [place].    Outcome: Ongoing, Progressing

## 2023-03-08 NOTE — PT/OT/SLP EVAL
Occupational Therapy   Evaluation    Name: Kylie Almonte  MRN: [id number]  Admitting Diagnosis: Closed Colles' fracture of left radius  Recent Surgery: * No surgery found *      Recommendations:     Discharge Recommendations: nursing facility, skilled  Discharge Equipment Recommendations:     Barriers to discharge:       Assessment:     Kylie Almonte is a 77 y.o. female with a medical diagnosis of Closed Colles' fracture of left radius, SDH.  She presents with the following performance deficits affecting function: weakness, impaired endurance, impaired self care skills, impaired functional mobility, pain, orthopedic precautions.    Pt reports she lives at [facility], [facility], where she is independent in ADLs and functional mobility. Today, she ambulated to bathroom with no AD, min A with HHA for RUE with no LOB. Pt completed toileting with min A, assist for doff of underwear and pants and assist to don new underwear and pants. Educated pt on dressing technique for LUE and all pxns (NWB, elevation,splint).       Rehab Prognosis: Good; patient would benefit from acute skilled OT services to address these deficits and reach maximum level of function.         Plan:     Patient to be seen 5 x/week, 6 x/week to address the above listed problems via self-care/home management, therapeutic activities, therapeutic exercises  Plan of Care Expires:    Plan of Care Reviewed with: patient, family    Subjective     Chief Complaint: hungry  Patient/Family Comments/goals: go home     Occupational Profile:  Living Environment: lives at [facility]  Previous level of function: indep  Roles and Routines: -  Equipment Used at Home: none  Assistance upon Discharge: [facility]    Pain/Comfort:  Pain Rating 1: 8/10  Location - Side 1: Left  Location 1: arm  Pain Addressed 1: Reposition    Patients cultural, spiritual, Sabianism conflicts given the current situation:      Objective:     Communicated with:  Patient found HOB elevated with   upon OT  entry to room.    General Precautions: Standard,    Orthopedic Precautions: LUE non weight bearing  Braces: UE Sling, UE brace  Respiratory Status: Nasal cannula, flow 2 L/min  BP: 111/66 HR 86     Occupational Performance:    Bed Mobility:    Patient completed Supine to Sit with minimum assistance  Patient completed Sit to Supine with minimum assistance    Functional Mobility/Transfers:  Patient completed Toilet Transfer Step Transfer technique with minimum assistance with  hand-held assist  Functional Mobility: no lob     Activities of Daily Living:  Lower Body Dressing: maximal assistance pt fearful to reach BLE for dressing  Toileting: minimum assistance      Cognitive/Visual Perceptual:  Cognitive/Psychosocial Skills:     -       Oriented to: Person, Place, and not oriented to month and name of hospital      Physical Exam:  Upper Extremity Strength:    -       Right Upper Extremity: WNL  -       Left Upper Extremity: NWB LUE; pt does have sensation in digits of LUE     AMPAC 6 Click ADL:  AMPAC Total Score:      Treatment & Education:  Educated pt on fall safety via call bell use and dressing techniques.     Patient left HOB elevated with all lines intact, call button in reach, and family present    GOALS:   Multidisciplinary Problems       Occupational Therapy Goals          Problem: Occupational Therapy    Goal Priority Disciplines Outcome Interventions   Occupational Therapy Goal     OT, PT/OT Ongoing, Progressing    Description: Goals to be met by: 3/22/2023     Patient will increase functional independence with ADLs by performing:    UE Dressing with Modified [place].  LE Dressing with Modified [place].  Grooming while standing with Modified [place].  Toileting from toilet with Modified [place] for hygiene and clothing management.   Toilet transfer to toilet with Modified [place].                         History:     Past Medical History:   Diagnosis Date    Anemia, unspecified      Anxiety disorder, unspecified     COPD (chronic obstructive pulmonary disease)     GERD (gastroesophageal reflux disease)     Hypertension        No past surgical history on file.    Time Tracking:     OT Date of Treatment:    OT Start Time: 1325  OT Stop Time: 1350  OT Total Time (min): 25 min    Billable Minutes:Evaluation Moderate Complexity     3/8/2023

## 2023-03-08 NOTE — NURSING
Nurses Note -- 4 Eyes      3/8/2023   2:00 PM      Skin assessed during: Admit      [x] No Pressure Injuries Present    [x]Prevention Measures Documented      [] Yes- Altered Skin Integrity Present or Discovered   [] LDA Added if Not in Epic (Describe Wound)   [] New Altered Skin Integrity was Present on Admit and Documented in LDA   [] Wound Image Taken    Wound Care Consulted? No    Attending Nurse:  Katie Echevarria RN     Second RN/Staff Member:  Tim Bird LPN

## 2023-03-08 NOTE — PLAN OF CARE
Pt transferred from ED and is ready for dc. I called [facility] will call report to jazmin Yeh info completed and this has been faxed along with copy of two scripts that will go with pt. Katie will call report and Ruba will tell her when they will arrive to transport and are aware pt is in room 1003  Packet completed to go with pt.   No further assist

## 2023-03-08 NOTE — H&P
HISTORY & PHYSICAL  Trauma and Acute Care Surgery    Patient Name: Kylie Almonte  YOB: 1945    Date: 03/08/2023                     PRESENTING HISTORY     Chief Complaint/Reason for Admission: fall    History of Present Illness:  Ms. Kylie Almonte is a 77 y.o. female admitted for SDH and left wrist fx after fall. she was going to put something in the refrigerator, she is on oxygen and the when she turned around she tripped on the oxygen tank and fell. Denies LOC.COPD at home. 3L here. Neurosurg called they want repeat CT head in AM    Review of Systems:  12 point ROS negative except as stated in HPI    PAST HISTORY:     Past Medical History:   Diagnosis Date    Anemia, unspecified     Anxiety disorder, unspecified     COPD (chronic obstructive pulmonary disease)     GERD (gastroesophageal reflux disease)     Hypertension        No past surgical history on file.    No family history on file.    Social History     Socioeconomic History    Marital status:    Tobacco Use    Smoking status: Unknown   Substance and Sexual Activity    Alcohol use: Not Currently    Drug use: Never       MEDICATIONS & ALLERGIES:     Current Facility-Administered Medications on File Prior to Encounter   Medication    [COMPLETED] 0.9%  NaCl infusion    [COMPLETED] levETIRAcetam injection 1,000 mg    [COMPLETED] morphine injection 2 mg    [COMPLETED] ondansetron injection 4 mg     No current outpatient medications on file prior to encounter.       Review of patient's allergies indicates:   Allergen Reactions    Levofloxacin Rash       OBJECTIVE:     Vital Signs:  Temp:  [97.7 °F (36.5 °C)-98 °F (36.7 °C)] 97.7 °F (36.5 °C)  Pulse:  [] 95  Resp:  [18-22] 20  SpO2:  [94 %-100 %] 100 %  BP: (114-130)/(66-74) 114/66  There is no height or weight on file to calculate BMI.     Physical Exam:  General:  cachectic  HEENT:  Normocephalic, atraumatic, PERRL, EOMI, clear sclera, ears normal, neck supple, throat  clear without erythema or exudates  CVS:  RRR  Resp: no wheezes, rales, rhonchi, cough  GI:  Abdomen soft, non-tender, non-distended, normoactive bowel sounds, no masses  :  Deferred  MSK:  palp pulses, splint on left wrist  Skin:  No rashes, ulcers, erythema  Neuro:  CNII-XII grossly intact  Psych:  Alert and oriented to person and place    Laboratory  Troponin:  No results for input(s): TROPONINI in the last 72 hours.  CBC:  Recent Labs     03/07/23 1928   WBC 12.7*   RBC 3.97*   HGB 10.5*   HCT 35.9*   *   MCV 90.4   MCH 26.4   MCHC 29.2*     CMP:  Recent Labs     03/07/23 1928   CALCIUM 9.5   ALBUMIN 2.9*      K 4.2   CO2 34*   BUN 22.0*   CREATININE 0.53*   ALKPHOS 127   ALT 14   AST 14   BILITOT 0.2     Lactic Acid:  Invalid input(s): LACTATIC  Etoh:  No results for input(s): ALCOHOLMEDIC in the last 72 hours.  Drug Screen:  No results for input(s): PCDSCOMETHA, COCAINEMETAB, OPIATESCREEN, BARBITURATES, AMPHETAMINES, MARIJUANATHC, PCDSOPHENCYN, CREATRANDUR, TOXINFO in the last 72 hours.      ABG:  No results for input(s): PH, PCO2, PO2, HCO3, BE, POCSATURATED in the last 72 hours.    Diagnostic Results:  Imaging Results    None           ASSESSMENT & PLAN:   Ms. Kylie Almonte is a 77 y.o. female s/p fall  SDH, left wrist fx    Plan:    Admit  Npo  Repeat ct in AM  Ortho and neuro consults    Laya Farooq MD  General Surgery Resident PGY4      3/8/2023  12:03 AM

## 2023-03-08 NOTE — ED PROVIDER NOTES
Encounter Date: 3/7/2023    SCRIBE #1 NOTE: I, Afua Henderson, am scribing for, and in the presence of,  Jonny Collado MD. I have scribed the following portions of the note - Other sections scribed: HPI, ROS, PE.     History     Chief Complaint   Patient presents with    Transfer     Transfer from [facility] er. Pt tripped/fell at nh. Left wrist fx, Sah. GCS15      77 year old female with history of anemia, anxiety, COPD, GERD, and HTN presents to the ED as a transfer from [facility] for subarachnoid hemorrhage. Pt reports that she was  putting something in her fridge and when she turned around she tripped over the wire connected to her oxygen tank. She notes that she hit her head and left wrist and was unable to get up. She complains of left wrist pain and denies head and neck pain. She notes that she lives alone and has had two falls in the past month. She is not on thinners.     The history is provided by the patient. No  was used.   Fall  The accident occurred today. The fall occurred while standing. She landed on A hard floor. The point of impact was the head and left wrist. The pain is present in the left wrist. She was Not ambulatory at the scene. There was No entrapment after the fall. There was No drug use involved in the accident. Pertinent negatives include no neck pain, no back pain, no fever, no abdominal pain, no nausea, no vomiting and no headaches.   Review of patient's allergies indicates:   Allergen Reactions    Levofloxacin Rash     Past Medical History:   Diagnosis Date    Anemia, unspecified     Anxiety disorder, unspecified     COPD (chronic obstructive pulmonary disease)     GERD (gastroesophageal reflux disease)     Hypertension      No past surgical history on file.  No family history on file.  Social History     Tobacco Use    Smoking status: Unknown   Substance Use Topics    Alcohol use: Not Currently    Drug use: Never     Review of Systems   Constitutional:  Negative for  chills and fever.   HENT:  Negative for congestion and ear pain.    Eyes:  Negative for discharge.   Respiratory:  Negative for cough, shortness of breath and wheezing.    Cardiovascular:  Negative for chest pain and leg swelling.   Gastrointestinal:  Negative for abdominal pain, constipation, diarrhea, nausea and vomiting.   Genitourinary:  Negative for dysuria, flank pain and frequency.   Musculoskeletal:  Negative for back pain, joint swelling and neck pain.        Left wrist pain    Skin:  Negative for rash.   Neurological:  Negative for dizziness, weakness and headaches.   Psychiatric/Behavioral:  Negative for agitation, confusion and hallucinations.      Physical Exam     Initial Vitals [03/07/23 2101]   BP Pulse Resp Temp SpO2   114/66 95 20 97.7 °F (36.5 °C) 100 %      MAP       --         Physical Exam    HENT:   Head: Normocephalic.   Eyes: EOM are normal. Right eye exhibits no discharge. Left eye exhibits no discharge. No scleral icterus.   Neck: Neck supple.   No midline tenderness    Cardiovascular:  Normal rate, regular rhythm and normal heart sounds.           Pulmonary/Chest: Breath sounds normal. No stridor. No respiratory distress. She has no wheezes. She has no rhonchi. She has no rales.   Abdominal: She exhibits no distension. There is no abdominal tenderness. There is no rebound and no guarding.   Musculoskeletal:         General: No tenderness or edema. Normal range of motion.      Cervical back: Neck supple.      Comments: Splint placed on left wrist     Neurological: She is alert and oriented to person, place, and time. She has normal strength. GCS score is 15. GCS eye subscore is 4. GCS verbal subscore is 5. GCS motor subscore is 6.   Distal left arm neurovascularly intact    Skin: Skin is dry. No rash noted. No erythema. No pallor.   Psychiatric: She has a normal mood and affect. Her behavior is normal. Judgment and thought content normal.       ED Course   Procedures  Labs Reviewed    COMPREHENSIVE METABOLIC PANEL - Abnormal; Notable for the following components:       Result Value    Creatinine 0.40 (*)     Calcium Level Total 7.5 (*)     Protein Total 5.3 (*)     Albumin Level 2.4 (*)     Albumin/Globulin Ratio 0.8 (*)     All other components within normal limits   LACTIC ACID, PLASMA - Normal   LACTIC ACID, PLASMA - Normal   LACTIC ACID, PLASMA - Normal          Imaging Results              CT Head Without Contrast (Final result)  Result time 03/08/23 08:02:20      Final result by Solitario Vasquez MD (03/08/23 08:02:20)                   Impression:      Previously visualized right frontal subarachnoid hemorrhage is less conspicuous on this exam.  No new suspicious findings.      Electronically signed by: Solitario Vasquez  Date:    03/08/2023  Time:    08:02               Narrative:    EXAMINATION:  CT HEAD WITHOUT CONTRAST    CLINICAL HISTORY:  Subdural hemorrhage;    TECHNIQUE:  CT imaging of the head performed from the skull base to the vertex without intravenous contrast.  mGycm. Automatic exposure control, adjustment of mA/kV or iterative reconstruction technique was used to reduce radiation.    COMPARISON:  Yesterday    FINDINGS:  Previously visualized right frontal subarachnoid blood is less conspicuous on this exam, vaguely seen on image 29 series 5.  No new areas of hemorrhage are seen.  No new parenchymal attenuation abnormality.  The ventricles are not enlarged.    There is some sphenoid sinus inflammation.  Mastoid air cells are clear                        Preliminary result by Solitario Vasquez MD (03/08/23 04:32:51)                   Narrative:    START OF REPORT:  TECHNIQUE: CT OF THE HEAD WAS PERFORMED WITHOUT INTRAVENOUS CONTRAST WITH AXIAL AS WELL AS CORONAL AND SAGITTAL IMAGES.    COMPARISON: COMPARISON IS WITH STUDY DATED â2023-02-22 06:01:22â.    DOSAGE INFORMATION: AUTOMATED EXPOSURE CONTROL WAS UTILIZED.    CLINICAL HISTORY: FU SDH.    Findings:  Hemorrhage: No  acute intracranial hemorrhage is seen.  CSF spaces: The ventricles, sulci and basal cisterns all appear mildly prominent suggesting an element of global cerebral atrophy.  Brain parenchyma: Mild microvascular change is seen in portions of the periventricular and deep white matter tracts.  Cerebellum: Unremarkable.  Sella and skull base: The sella appears to be within normal limits for age.  Intracranial calcifications: Incidental note is made of bilateral choroid plexus calcification. Incidental note is made of some pineal region calcification. Incidental note is made of some calcification of the falx.  Calvarium: No acute linear or depressed skull fracture is seen.    Maxillofacial Structures:  Paranasal sinuses: The visualized paranasal sinuses appear clear with no mucoperiosteal thickening or air fluid levels identified.  Orbits: The orbits appear unremarkable.  Zygomatic arches: The zygomatic arches are intact and unremarkable.  Temporal bones and mastoids: The temporal bones and mastoids appear unremarkable.  TMJ: The mandibular condyles appear normally placed with respect to the mandibular fossa.      Impression:  1. No acute intracranial process identified. No subdural hemorrhage identified. Details and findings as noted above.                          Preliminary result by Nolan Calderon Jr., MD (03/08/23 04:32:51)                   Narrative:    START OF REPORT:  TECHNIQUE: CT OF THE HEAD WAS PERFORMED WITHOUT INTRAVENOUS CONTRAST WITH AXIAL AS WELL AS CORONAL AND SAGITTAL IMAGES.    COMPARISON: COMPARISON IS WITH STUDY DATED â2023-02-22 06:01:22â.    DOSAGE INFORMATION: AUTOMATED EXPOSURE CONTROL WAS UTILIZED.    CLINICAL HISTORY: FU SDH.    Findings:  Hemorrhage: No acute intracranial hemorrhage is seen.  CSF spaces: The ventricles, sulci and basal cisterns all appear mildly prominent suggesting an element of global cerebral atrophy.  Brain parenchyma: Mild microvascular change is seen in  portions of the periventricular and deep white matter tracts.  Cerebellum: Unremarkable.  Sella and skull base: The sella appears to be within normal limits for age.  Intracranial calcifications: Incidental note is made of bilateral choroid plexus calcification. Incidental note is made of some pineal region calcification. Incidental note is made of some calcification of the falx.  Calvarium: No acute linear or depressed skull fracture is seen.    Maxillofacial Structures:  Paranasal sinuses: The visualized paranasal sinuses appear clear with no mucoperiosteal thickening or air fluid levels identified.  Orbits: The orbits appear unremarkable.  Zygomatic arches: The zygomatic arches are intact and unremarkable.  Temporal bones and mastoids: The temporal bones and mastoids appear unremarkable.  TMJ: The mandibular condyles appear normally placed with respect to the mandibular fossa.      Impression:  1. No acute intracranial process identified. No subdural hemorrhage identified. Details and findings as noted above.                                         Medications - No data to display             Attending Attestation:           Physician Attestation for Scribe:  Physician Attestation Statement for Scribe #1: I, Jonny Collado MD, reviewed documentation, as scribed by Afua Henderson in my presence, and it is both accurate and complete.       Medical Decision Making  Differential diagnosis includes but is not limited to long bone fracture and subarachnoid hemorrhage     Amount and/or Complexity of Data Reviewed  External Data Reviewed: notes.  Labs: ordered.  Radiology: ordered and independent interpretation performed.            ED Course as of 03/10/23 0402   Tue Mar 07, 2023   2310 Dr. Kraus - ok to admit to floor, ct findings may be artifactual, rpt ct in am [NL]      ED Course User Index  [NL] Jonny Collado MD                 Clinical Impression:   Final diagnoses:  [I60.9] Subarachnoid hemorrhage         ED Disposition Condition    Admit                 Jonny Collado MD  03/10/23 0961

## 2023-03-08 NOTE — NURSING
Discharge instructions and follow up reviewed with patient and son at bedside. Report called to Ruba at [facility] at 14:39, all questions answered. Facility transport will  patient.

## 2023-03-08 NOTE — DISCHARGE SUMMARY
[facility] Neuro  Trauma Surgery  Discharge Summary      Patient Name: Kylie Almonte  MRN: [id number]  Admission Date: 3/7/2023  Hospital Length of Stay: 1 days  Discharge Date and Time:  03/08/2023 1:30 PM  Attending Physician: Guille Reddy MD   Discharging Provider: JONI Miranda  Primary Care Provider: Germain Garrett MD    HPI:   No notes on file    * No surgery found *      Indwelling Lines/Drains at time of discharge:   Lines/Drains/Airways     None               Hospital Course: 77-year-old female presented status post fall nursing home.  She was found to have a left forearm fracture as well as a head bleed.  Her repeat CT this morning showed resolution of the head bleed.  Orthopedics recommended operative intervention as an inpatient or outpatient.  After further discussion with them we will discharge the patient back to nursing home she can follow up their clinic to have elective left forearm procedure.  The patient was set are aware of the plan and agreeable.  Case management has a range with a nursing home to return to the clinic.  She will be discharged with a short course of Ultram and completion of her for Keppra dosing.  She can call Neurosurgery Orthopedics for any questions.      Goals of Care Treatment Preferences:  Code Status: Full Code      Consults:   Consults (From admission, onward)        Status Ordering Provider     Inpatient consult to Orthopedic Surgery  Once        Provider:  Yfn Monsivais MD    Acknowledged FLORENTIN HANNAH     Inpatient consult to Neurosurgery  Once        Provider:  Juvencio Kraus MD    Completed FLORENTIN HANNAH          Significant Diagnostic Studies:     Pending Diagnostic Studies:     Procedure Component Value Units Date/Time    Lactic acid, plasma [[id number]]     Order Status: Sent Lab Status: No result     Specimen: Blood         Final Active Diagnoses:    Diagnosis Date Noted POA    PRINCIPAL PROBLEM:  Closed  Colles' fracture of left radius [S52.532A] 03/08/2023 Yes      Problems Resolved During this Admission:      Discharged Condition: good    Disposition: Nursing Facility    Follow Up:    Patient Instructions:      Weight bearing restrictions (specify):   Order Comments: TRES TORRES     Medications:  Reconciled Home Medications:      Medication List      START taking these medications    levETIRAcetam 500 MG Tab  Commonly known as: KEPPRA  Take 1 tablet (500 mg total) by mouth 2 (two) times daily. for 6 days     traMADoL 50 mg tablet  Commonly known as: ULTRAM  Take 1 tablet (50 mg total) by mouth every 6 (six) hours as needed for Pain.          Time spent on the discharge of patient: 35 minutes    JONI Miranda  Trauma Surgery  [facility] - Ortho Neuro

## 2023-03-08 NOTE — CONSULTS
[facility] Neuro  Neurosurgery  Consult Note    Inpatient consult to Neurosurgery  Consult performed by: GRANT Blake  Consult ordered by: Jonny Collado MD  Reason for consult: SAH      Subjective:     Chief Complaint/Reason for Admission: SAH    History of Present Illness: Patient is a 77 y.o. female with PMHx significant for COPD, GERD and HTN, who was transferred from [facility] following fall. Patient was trying to get something from her refrigerator when she tripped over her oxygen machine, falling and hitting the left side of her head and landing on her left arm. She was transferred to [facility] after CT imaging was significant for SAH. Dr. Kraus has been consulted for evaluation and treatment recommendations. She is also being evaluated for a left wrist fracture.    On PE today she is lying in bed, NAD. She denies HA, blurred vision and N/V. She c/o left wrist pain, currently in splint. No other complaints.    No medications prior to admission.       Review of patient's allergies indicates:   Allergen Reactions    Levofloxacin Rash       Past Medical History:   Diagnosis Date    Anemia, unspecified     Anxiety disorder, unspecified     COPD (chronic obstructive pulmonary disease)     GERD (gastroesophageal reflux disease)     Hypertension      No past surgical history on file.  Family History    None       Tobacco Use    Smoking status: Unknown    Smokeless tobacco: Not on file   Substance and Sexual Activity    Alcohol use: Not Currently    Drug use: Never    Sexual activity: Not on file     Review of Systems  Objective:   12 pt ROS WNL, except for HPI    Weight: 38.1 kg (84 lb)  Body mass index is 16.41 kg/m².  Vital Signs (Most Recent):  Temp: 97.7 °F (36.5 °C) (03/07/23 2101)  Pulse: 87 (03/08/23 0814)  Resp: 17 (03/08/23 0814)  BP: 112/66 (03/08/23 0801)  SpO2: 97 % (03/08/23 0814) Vital Signs (24h Range):  Temp:  [97.7 °F (36.5 °C)-98 °F (36.7 °C)] 97.7 °F (36.5 °C)  Pulse:   [] 87  Resp:  [17-28] 17  SpO2:  [94 %-100 %] 97 %  BP: (107-130)/(63-74) 112/66     Physical Exam:  Nursing note and vitals reviewed.    Constitutional: She appears well-developed and well-nourished. No distress.     Eyes: Pupils are equal, round, and reactive to light. EOM are normal.     Cardiovascular: Intact distal pulses.     Abdominal: Soft. Bowel sounds are normal.     Skin:   Bruise to left temple.     Psych/Behavior:   Alert, oriented to person and place. Confused on year. Speech clear. Follows commands briskly in all ext.     Musculoskeletal:        Neck: Range of motion is full.        Back: Range of motion is full.        Right Upper Extremities: Range of motion is full. Muscle strength is 5/5.        Left Upper Extremities: Range of motion is limited. ROM severity is Limited d/t splint.       Right Lower Extremities: Range of motion is full. Muscle strength is 5/5.        Left Lower Extremities: Range of motion is full. Muscle strength is 5/5.     Neurological:        DTRs: She displays no Babinski's sign on the right side. She displays no Babinski's sign on the left side.        Cranial nerves: Cranial nerve(s) II, III, IV, V, VI, VII, VIII, IX, X, XI and XII are intact.     Significant Labs:  Recent Labs   Lab 03/07/23 1928 03/08/23  0441    141   K 4.2 4.0   CO2 34* 26   BUN 22.0* 12.3   CREATININE 0.53* 0.40*   CALCIUM 9.5 7.5*     Recent Labs   Lab 03/07/23 1928   WBC 12.7*   HGB 10.5*   HCT 35.9*   *     Recent Labs   Lab 03/07/23 1928   INR 1.01     Microbiology Results (last 7 days)       ** No results found for the last 168 hours. **            Significant Diagnostics:  CT Head Without Contrast [[id number]] Resulted: 03/08/23 0802   Order Status: Completed Updated: 03/08/23 0804   Narrative:     EXAMINATION:   CT HEAD WITHOUT CONTRAST     CLINICAL HISTORY:   Subdural hemorrhage;     TECHNIQUE:   CT imaging of the head performed from the skull base to the vertex without  intravenous contrast.  mGycm. Automatic exposure control, adjustment of mA/kV or iterative reconstruction technique was used to reduce radiation.     COMPARISON:   Yesterday     FINDINGS:   Previously visualized right frontal subarachnoid blood is less conspicuous on this exam, vaguely seen on image 29 series 5.  No new areas of hemorrhage are seen.  No new parenchymal attenuation abnormality.  The ventricles are not enlarged.     There is some sphenoid sinus inflammation.  Mastoid air cells are clear    Impression:       Previously visualized right frontal subarachnoid hemorrhage is less conspicuous on this exam.  No new suspicious findings.        Assessment/Plan:     Active Diagnoses:    Diagnosis Date Noted POA    Closed Colles' fracture of left radius [S52.532A] 03/08/2023 Unknown      Problems Resolved During this Admission:     She is at baseline mentation per family at bedside. She currently denies HA.  CT head this am shows improved SAH. OK for lovenox if needed.  OK to dc home from our standpoint  No f/u needed    Thank you for your consult. I will sign off. Please contact us if you have any additional questions.    GRANT Blake  Neurosurgery  [facility] - Ortho Neuro

## 2023-03-08 NOTE — PT/OT/SLP EVAL
"Physical Therapy Evaluation/Re-Evaluation    Patient Name:  Kylie Almonte   MRN:  [id number]    Recommendations:     Discharge Recommendations: nursing facility, skilled   Discharge Equipment Recommendations: none   Barriers to discharge:  medical dx, decreased functional independence, decreased safety awareness, fall risk     Assessment:     Kylie Almonte is a 77 y.o. female admitted with a medical diagnosis of Closed Colles' fracture of left radius, SDH. Injuries are asa result of a fall. She presents with the following impairments/functional limitations: weakness, gait instability, impaired endurance, impaired balance, decreased lower extremity function, impaired cardiopulmonary response to activity, impaired self care skills, impaired functional mobility, decreased safety awareness, impaired cognition.    Rehab Prognosis: Good; patient would benefit from acute skilled PT services to address these deficits and reach maximum level of function.    Recent Surgery: * No surgery found *      Plan:     During this hospitalization, patient to be seen 6 x/week to address the identified rehab impairments via gait training, therapeutic activities, therapeutic exercises and progress toward the following goals:    Plan of Care Expires:  04/08/23    Subjective     Chief Complaint: "I need some oxygen"  Patient/Family Comments/goals: to [facility] to [facility]   Pain/Comfort:  Pain Rating 1: 8/10  Location - Side 1: Left  Location 1: arm    Patients cultural, spiritual, Denominational conflicts given the current situation: no    Living Environment:  Pt lives at a NH   Prior to admission, patients level of function was independent.    Equipment used at home: none.  DME owned (not currently used): rolling walker.    Upon discharge, patient will have assistance from NH staff.    Objective:     Communicated with NSG prior to session.  Patient found  getting up from toilet with CNA present  with oxygen (splint on LUE with sling " johnna)  upon PT entry to room.    General Precautions: Standard, fall  Orthopedic Precautions:LUE non weight bearing   Braces:  (L UE sling + splint)  Respiratory Status: Nasal cannula, flow 3 L/min   SpO2: 90% at rest   SpO2: 79% with ax      Exams:  Cognitive Exam:  Patient is oriented to Person and Time  RLE Strength: WFL  LLE Strength: WFL  Skin integrity: Visible skin intact      Functional Mobility:  Bed Mobility:     Sit to Supine: minimum assistance  Transfers:     Sit to Stand:  contact guard assistance with hand-held assist  Gait: pt ambulated approx 40 ft with R HHA; step through pattern, slightly unsteady, limited by decreased endurance with complaints of SOB- vitals assessed and O2 decreased. Pt was then seated on EOB with VC for pursed lip breathing, increased time to recover so pt was returned b2b    Patient provided with verbal education regarding POC, mobility, and safety.  Understanding was verbalized, however additional teaching warranted.     Patient left HOB elevated with all lines intact, call button in reach, and son present.    GOALS:   Multidisciplinary Problems       Physical Therapy Goals          Problem: Physical Therapy    Goal Priority Disciplines Outcome Goal Variances Interventions   Physical Therapy Goal     PT, PT/OT Ongoing, Progressing     Description: Goals to be met by: 23     Patient will increase functional independence with mobility by performin. Supine to sit with Stand-by Assistance  2. Sit to supine with Stand-by Assistance  3. Sit to stand transfer with Stand-by Assistance  4. Gait  x 150 feet with Stand-by Assistance using Quad Cane vs LRAD.                          History:     Past Medical History:   Diagnosis Date    Anemia, unspecified     Anxiety disorder, unspecified     COPD (chronic obstructive pulmonary disease)     GERD (gastroesophageal reflux disease)     Hypertension        No past surgical history on file.    Time Tracking:     PT Received On:   3/8/2023  PT Start Time: 1532     PT Stop Time: 1548  PT Total Time (min): 16 min     Billable Minutes: Evaluation mod      03/08/2023

## 2023-03-10 ENCOUNTER — PATIENT OUTREACH (OUTPATIENT)
Dept: ADMINISTRATIVE | Facility: CLINIC | Age: 78
End: 2023-03-10
Payer: MEDICARE

## 2023-03-13 ENCOUNTER — OFFICE VISIT (OUTPATIENT)
Dept: ORTHOPEDICS | Facility: CLINIC | Age: 78
End: 2023-03-13
Payer: MEDICARE

## 2023-03-13 ENCOUNTER — HOSPITAL ENCOUNTER (OUTPATIENT)
Dept: RADIOLOGY | Facility: CLINIC | Age: 78
Discharge: HOME | End: 2023-03-13
Attending: ORTHOPAEDIC SURGERY
Payer: MEDICARE

## 2023-03-13 VITALS
HEIGHT: 60 IN | DIASTOLIC BLOOD PRESSURE: 68 MMHG | TEMPERATURE: 97 F | WEIGHT: 84 LBS | RESPIRATION RATE: 18 BRPM | BODY MASS INDEX: 16.49 KG/M2 | HEART RATE: 114 BPM | SYSTOLIC BLOOD PRESSURE: 116 MMHG

## 2023-03-13 DIAGNOSIS — S52.532D CLOSED COLLES' FRACTURE OF LEFT RADIUS WITH ROUTINE HEALING, SUBSEQUENT ENCOUNTER: ICD-10-CM

## 2023-03-13 DIAGNOSIS — S52.532D CLOSED COLLES' FRACTURE OF LEFT RADIUS WITH ROUTINE HEALING, SUBSEQUENT ENCOUNTER: Primary | ICD-10-CM

## 2023-03-13 PROCEDURE — 73110 X-RAY EXAM OF WRIST: CPT | Mod: LT,,, | Performed by: ORTHOPAEDIC SURGERY

## 2023-03-13 PROCEDURE — 99213 PR OFFICE/OUTPT VISIT, EST, LEVL III, 20-29 MIN: ICD-10-PCS | Mod: 57,,, | Performed by: NURSE PRACTITIONER

## 2023-03-13 PROCEDURE — 25600 CLTX DST RDL FX/EPHYS SEP WO: CPT | Mod: LT,,, | Performed by: NURSE PRACTITIONER

## 2023-03-13 PROCEDURE — 99213 OFFICE O/P EST LOW 20 MIN: CPT | Mod: 57,,, | Performed by: NURSE PRACTITIONER

## 2023-03-13 PROCEDURE — 25600 PR CLOSED RX DIST RAD/ULNA FX: ICD-10-PCS | Mod: LT,,, | Performed by: NURSE PRACTITIONER

## 2023-03-13 PROCEDURE — 73110 XR WRIST COMPLETE 3 VIEWS LEFT: ICD-10-PCS | Mod: LT,,, | Performed by: ORTHOPAEDIC SURGERY

## 2023-03-13 NOTE — PROGRESS NOTES
Subjective:       Patient ID: Kylie Almonte is a 77 y.o. female.    Chief Complaint   Patient presents with    Left Wrist - Injury     6 DAY F/U FROM ER FOR FALL INJURING LEFT WRIST.  SEEN IN ER, XRAYS, SUGARTONG SPLINT.          Patient is here today for evaluation of a left wrist injury.  She had a fall 6 days ago.  She was evaluated in consultation while in the emergency room and is here today to discuss definitive management of her left wrist injury.  She has been in a sugar-tong splint.  She is been nonweightbearing to the arm and has been resting in a sling.  Her pain is controlled.  She has no other complaints or injuries today.      Review of Systems   Constitutional: Negative for chills and fever.   HENT:  Negative for congestion and hearing loss.    Eyes:  Negative for visual disturbance.   Cardiovascular:  Negative for chest pain and syncope.   Respiratory:  Negative for cough and shortness of breath.    Hematologic/Lymphatic: Does not bruise/bleed easily.   Skin:  Negative for color change and rash.   Gastrointestinal:  Negative for abdominal pain, nausea and vomiting.   Genitourinary:  Negative for dysuria and hematuria.   Neurological:  Negative for numbness, sensory change and weakness.   Psychiatric/Behavioral:  Negative for altered mental status.       Current Outpatient Medications on File Prior to Visit   Medication Sig Dispense Refill    alendronate (FOSAMAX) 70 MG tablet Take 70 mg by mouth every 7 days.      ALPRAZolam (XANAX) 0.25 MG tablet Take 0.25 mg by mouth daily as needed.      ALPRAZolam (XANAX) 0.5 MG tablet Take 0.5 mg by mouth 4 (four) times daily as needed.      amLODIPine (NORVASC) 5 MG tablet Take 5 mg by mouth once daily.      atorvastatin (LIPITOR) 10 MG tablet Take 10 mg by mouth once daily.      BREO ELLIPTA 200-25 mcg/dose DsDv diskus inhaler Inhale 1 puff into the lungs once daily.      gabapentin (NEURONTIN) 300 MG capsule Take 300 mg by mouth 3 (three) times daily.       levETIRAcetam (KEPPRA) 500 MG Tab Take 1 tablet (500 mg total) by mouth 2 (two) times daily. for 6 days 12 tablet 0    potassium chloride SA (K-DUR,KLOR-CON) 20 MEQ tablet Take 20 mEq by mouth 2 (two) times daily.      sertraline (ZOLOFT) 50 MG tablet Take 50 mg by mouth once daily.      traMADoL (ULTRAM) 50 mg tablet Take 1 tablet (50 mg total) by mouth every 6 (six) hours as needed for Pain. 12 tablet 0    VENTOLIN HFA 90 mcg/actuation inhaler Inhale 2 puffs into the lungs every 4 to 6 hours as needed.       No current facility-administered medications on file prior to visit.          Objective:      /68   Pulse (!) 114   Temp 97 °F (36.1 °C) (Oral)   Resp 18   Ht 5' (1.524 m)   Wt 38.1 kg (83 lb 15.9 oz)   BMI 16.40 kg/m²   Physical Exam  Constitutional:       General: She is not in acute distress.     Appearance: Normal appearance.   HENT:      Head: Normocephalic and atraumatic.      Mouth/Throat:      Mouth: Mucous membranes are moist.   Eyes:      Extraocular Movements: Extraocular movements intact.   Cardiovascular:      Rate and Rhythm: Normal rate.      Pulses: Normal pulses.   Pulmonary:      Effort: Pulmonary effort is normal. No respiratory distress.   Abdominal:      General: There is no distension.      Palpations: Abdomen is soft.      Tenderness: There is no abdominal tenderness.   Musculoskeletal:      Cervical back: Normal range of motion and neck supple.      Comments: Let wrist: minimal swelling. No deformity. Skin intact with no abrasions or open wounds. Compartments are soft and compressible. Ecchymosis noted to the wrist and forearm. Tenderness over distal radius as expected. No wrist Rom attempted. RP 2+. BCR distally. Motor intact to digits. SLT intact distally.    Neurological:      Mental Status: She is alert and oriented to person, place, and time. Mental status is at baseline.   Psychiatric:         Mood and Affect: Mood normal.         Behavior: Behavior normal.          Thought Content: Thought content normal.         Judgment: Judgment normal.      Body mass index is 16.4 kg/m².    Radiology: 3 view xray left wrist: displaced distal radius fracture        Assessment:         1. Closed Colles' fracture of left radius with routine healing, subsequent encounter  X-Ray Wrist Complete Left              Plan:       Patient has a left distal radius fracture. Alignment is stable on xrays today. Treatment options were discussed with the patient by Dr. Monsivais today including surgical treatment with open reduction internal fixation versus non operative treatment with a cast. Her alignment is amenable to closed treatment and she would like to avoid surgery and has elected for non operative management. She was placed into a well padded short arm cast today. Remain NWB TO L UE with no lifting, pushing, pulling >2 lbs. Elevate UE for swelling. F/u in 1 month for repeat xrays and evaluation. We will likely transition to an exos brace at that time. All questions and concerns were addressed with the patient and her family. They understand and agree with the treatment plan.     The above findings, diagnosis, and treatment plan were discussed with Dr. Yfn Monsivais who is in agreement and has personally examined the patient today.     Follow up in about 4 weeks (around 4/10/2023).    Closed Colles' fracture of left radius with routine healing, subsequent encounter  -     X-Ray Wrist Complete Left; Future; Expected date: 03/13/2023              Orders Placed This Encounter   Procedures    X-Ray Wrist Complete Left     Standing Status:   Future     Number of Occurrences:   1     Standing Expiration Date:   3/9/2024     Order Specific Question:   May the Radiologist modify the order per protocol to meet the clinical needs of the patient?     Answer:   Yes     Order Specific Question:   Release to patient     Answer:   Immediate       Future Appointments   Date Time Provider Department Center   4/18/2023   1:30 PM fYn Monsivais MD [facility]

## 2023-04-17 ENCOUNTER — HOSPITAL ENCOUNTER (EMERGENCY)
Facility: HOSPITAL | Age: 78
Discharge: HOME | End: 2023-04-17
Attending: EMERGENCY MEDICINE
Payer: MEDICARE

## 2023-04-17 VITALS
DIASTOLIC BLOOD PRESSURE: 93 MMHG | HEART RATE: 98 BPM | WEIGHT: 78 LBS | OXYGEN SATURATION: 95 % | RESPIRATION RATE: 22 BRPM | BODY MASS INDEX: 15.23 KG/M2 | SYSTOLIC BLOOD PRESSURE: 125 MMHG | TEMPERATURE: 98 F

## 2023-04-17 DIAGNOSIS — J44.9 COPD (CHRONIC OBSTRUCTIVE PULMONARY DISEASE): ICD-10-CM

## 2023-04-17 DIAGNOSIS — R07.89 ATYPICAL CHEST PAIN: Primary | ICD-10-CM

## 2023-04-17 DIAGNOSIS — R05.9 COUGH: ICD-10-CM

## 2023-04-17 DIAGNOSIS — J06.9 UPPER RESPIRATORY TRACT INFECTION, UNSPECIFIED TYPE: ICD-10-CM

## 2023-04-17 LAB
ALBUMIN SERPL-MCNC: 3.7 G/DL (ref 3.4–4.8)
ALBUMIN/GLOB SERPL: 0.7 RATIO (ref 1.1–2)
ALP SERPL-CCNC: 87 UNIT/L (ref 40–150)
ALT SERPL-CCNC: 18 UNIT/L (ref 0–55)
APPEARANCE UR: CLEAR
AST SERPL-CCNC: 35 UNIT/L (ref 5–34)
BACTERIA #/AREA URNS AUTO: ABNORMAL /HPF
BASOPHILS # BLD AUTO: 0.12 X10(3)/MCL (ref 0–0.2)
BASOPHILS NFR BLD AUTO: 0.5 %
BILIRUB UR QL STRIP.AUTO: ABNORMAL MG/DL
BILIRUBIN DIRECT+TOT PNL SERPL-MCNC: 0.3 MG/DL
BNP BLD-MCNC: <10 PG/ML
BUN SERPL-MCNC: 16 MG/DL (ref 9.8–20.1)
CALCIUM SERPL-MCNC: 10.1 MG/DL (ref 8.4–10.2)
CHLORIDE SERPL-SCNC: 95 MMOL/L (ref 98–107)
CO2 SERPL-SCNC: 29 MMOL/L (ref 23–31)
COLOR UR AUTO: YELLOW
CREAT SERPL-MCNC: 0.52 MG/DL (ref 0.55–1.02)
EOSINOPHIL # BLD AUTO: 0.34 X10(3)/MCL (ref 0–0.9)
EOSINOPHIL NFR BLD AUTO: 1.3 %
ERYTHROCYTE [DISTWIDTH] IN BLOOD BY AUTOMATED COUNT: 14.9 % (ref 11.5–17)
GFR SERPLBLD CREATININE-BSD FMLA CKD-EPI: >60 MLS/MIN/1.73/M2
GLOBULIN SER-MCNC: 5.1 GM/DL (ref 2.4–3.5)
GLUCOSE SERPL-MCNC: 101 MG/DL (ref 82–115)
GLUCOSE UR QL STRIP.AUTO: NEGATIVE MG/DL
HCT VFR BLD AUTO: 41.2 % (ref 37–47)
HGB BLD-MCNC: 12.1 G/DL (ref 12–16)
IMM GRANULOCYTES # BLD AUTO: 0.12 X10(3)/MCL (ref 0–0.04)
IMM GRANULOCYTES NFR BLD AUTO: 0.5 %
KETONES UR QL STRIP.AUTO: ABNORMAL MG/DL
LACTATE SERPL-SCNC: 1.6 MMOL/L (ref 0.5–2.2)
LEUKOCYTE ESTERASE UR QL STRIP.AUTO: ABNORMAL UNIT/L
LIPASE SERPL-CCNC: 14 U/L
LYMPHOCYTES # BLD AUTO: 2.31 X10(3)/MCL (ref 0.6–4.6)
LYMPHOCYTES NFR BLD AUTO: 9 %
MCH RBC QN AUTO: 27.6 PG (ref 27–31)
MCHC RBC AUTO-ENTMCNC: 29.4 G/DL (ref 33–36)
MCV RBC AUTO: 93.8 FL (ref 80–94)
MONOCYTES # BLD AUTO: 1.24 X10(3)/MCL (ref 0.1–1.3)
MONOCYTES NFR BLD AUTO: 4.8 %
MUCOUS THREADS URNS QL MICRO: ABNORMAL /LPF
NEUTROPHILS # BLD AUTO: 21.65 X10(3)/MCL (ref 2.1–9.2)
NEUTROPHILS NFR BLD AUTO: 83.9 %
NITRITE UR QL STRIP.AUTO: NEGATIVE
PH UR STRIP.AUTO: 5.5 [PH]
PLATELET # BLD AUTO: 450 X10(3)/MCL (ref 130–400)
PMV BLD AUTO: 9.2 FL (ref 7.4–10.4)
POTASSIUM SERPL-SCNC: 5.7 MMOL/L (ref 3.5–5.1)
PROT SERPL-MCNC: 8.8 GM/DL (ref 5.8–7.6)
PROT UR QL STRIP.AUTO: ABNORMAL MG/DL
RBC # BLD AUTO: 4.39 X10(6)/MCL (ref 4.2–5.4)
RBC #/AREA URNS AUTO: ABNORMAL /HPF
RBC UR QL AUTO: NEGATIVE UNIT/L
SODIUM SERPL-SCNC: 136 MMOL/L (ref 136–145)
SP GR UR STRIP.AUTO: 1.02
SQUAMOUS #/AREA URNS AUTO: ABNORMAL /HPF
TROPONIN I SERPL-MCNC: 0.01 NG/ML (ref 0–0.04)
TROPONIN I SERPL-MCNC: <0.01 NG/ML (ref 0–0.04)
UROBILINOGEN UR STRIP-ACNC: 0.2 MG/DL
WBC # SPEC AUTO: 25.8 X10(3)/MCL (ref 4.5–11.5)
WBC #/AREA URNS AUTO: ABNORMAL /HPF

## 2023-04-17 PROCEDURE — 63600175 PHARM REV CODE 636 W HCPCS: Performed by: EMERGENCY MEDICINE

## 2023-04-17 PROCEDURE — 80053 COMPREHEN METABOLIC PANEL: CPT | Performed by: EMERGENCY MEDICINE

## 2023-04-17 PROCEDURE — 96361 HYDRATE IV INFUSION ADD-ON: CPT

## 2023-04-17 PROCEDURE — 99285 EMERGENCY DEPT VISIT HI MDM: CPT | Mod: 25

## 2023-04-17 PROCEDURE — 83605 ASSAY OF LACTIC ACID: CPT | Performed by: EMERGENCY MEDICINE

## 2023-04-17 PROCEDURE — 87040 BLOOD CULTURE FOR BACTERIA: CPT | Performed by: EMERGENCY MEDICINE

## 2023-04-17 PROCEDURE — 83880 ASSAY OF NATRIURETIC PEPTIDE: CPT | Performed by: EMERGENCY MEDICINE

## 2023-04-17 PROCEDURE — 25000003 PHARM REV CODE 250: Performed by: EMERGENCY MEDICINE

## 2023-04-17 PROCEDURE — 83690 ASSAY OF LIPASE: CPT | Performed by: EMERGENCY MEDICINE

## 2023-04-17 PROCEDURE — 93010 ELECTROCARDIOGRAM REPORT: CPT | Mod: ,,, | Performed by: STUDENT IN AN ORGANIZED HEALTH CARE EDUCATION/TRAINING PROGRAM

## 2023-04-17 PROCEDURE — 93005 ELECTROCARDIOGRAM TRACING: CPT

## 2023-04-17 PROCEDURE — 93010 EKG 12-LEAD: ICD-10-PCS | Mod: ,,, | Performed by: STUDENT IN AN ORGANIZED HEALTH CARE EDUCATION/TRAINING PROGRAM

## 2023-04-17 PROCEDURE — 85025 COMPLETE CBC W/AUTO DIFF WBC: CPT | Performed by: EMERGENCY MEDICINE

## 2023-04-17 PROCEDURE — 81001 URINALYSIS AUTO W/SCOPE: CPT | Performed by: EMERGENCY MEDICINE

## 2023-04-17 PROCEDURE — 84484 ASSAY OF TROPONIN QUANT: CPT | Performed by: EMERGENCY MEDICINE

## 2023-04-17 PROCEDURE — 96365 THER/PROPH/DIAG IV INF INIT: CPT

## 2023-04-17 RX ORDER — SODIUM CHLORIDE 9 MG/ML
500 INJECTION, SOLUTION INTRAVENOUS
Status: COMPLETED | OUTPATIENT
Start: 2023-04-17 | End: 2023-04-17

## 2023-04-17 RX ORDER — IPRATROPIUM BROMIDE AND ALBUTEROL SULFATE 2.5; .5 MG/3ML; MG/3ML
SOLUTION RESPIRATORY (INHALATION)
COMMUNITY
Start: 2022-10-28

## 2023-04-17 RX ORDER — IPRATROPIUM BROMIDE AND ALBUTEROL SULFATE 2.5; .5 MG/3ML; MG/3ML
3 SOLUTION RESPIRATORY (INHALATION)
Status: DISCONTINUED | OUTPATIENT
Start: 2023-04-17 | End: 2023-04-17

## 2023-04-17 RX ORDER — OMEPRAZOLE 40 MG/1
40 CAPSULE, DELAYED RELEASE ORAL
COMMUNITY
Start: 2023-04-14

## 2023-04-17 RX ORDER — AZITHROMYCIN 250 MG/1
250 TABLET, FILM COATED ORAL DAILY
Qty: 6 TABLET | Refills: 0 | Status: SHIPPED | OUTPATIENT
Start: 2023-04-17

## 2023-04-17 RX ORDER — ACETAMINOPHEN 325 MG/1
650 TABLET ORAL
Status: COMPLETED | OUTPATIENT
Start: 2023-04-17 | End: 2023-04-17

## 2023-04-17 RX ORDER — VIT C/E/ZN/COPPR/LUTEIN/ZEAXAN 250MG-90MG
1000 CAPSULE ORAL
COMMUNITY

## 2023-04-17 RX ORDER — LACTULOSE 10 G/15ML
SOLUTION ORAL; RECTAL
COMMUNITY
Start: 2023-02-27

## 2023-04-17 RX ORDER — METHYLPREDNISOLONE SOD SUCC 125 MG
125 VIAL (EA) INJECTION
Status: DISCONTINUED | OUTPATIENT
Start: 2023-04-17 | End: 2023-04-17

## 2023-04-17 RX ORDER — BUDESONIDE 0.5 MG/2ML
INHALANT ORAL
COMMUNITY
Start: 2023-03-31

## 2023-04-17 RX ADMIN — SODIUM CHLORIDE 500 ML: 9 INJECTION, SOLUTION INTRAVENOUS at 11:04

## 2023-04-17 RX ADMIN — ACETAMINOPHEN 650 MG: 325 TABLET, FILM COATED ORAL at 09:04

## 2023-04-17 RX ADMIN — CEFTRIAXONE SODIUM 1 G: 1 INJECTION, POWDER, FOR SOLUTION INTRAMUSCULAR; INTRAVENOUS at 09:04

## 2023-04-17 NOTE — ED PROVIDER NOTES
Encounter Date: 4/17/2023       History     Chief Complaint   Patient presents with    Chest Pain     C/o chest pain and cough. States currently taking abx for cough     77-year-old female history of severe COPD on home oxygen at nursing home, anxiety, GERD, hypertension, chronic anemia, previous pneumonia now with complaints of mild mid chest discomfort x4 hours.  Patient states the pain is 2/10, no radiation, no exacerbating or alleviating factors. She does complain of a mild productive cough of yellow sputum.  Patient denies associated increased shortness of breath, fever, headache, abdominal pain, increasing leg edema or calf pain, dysuria hematuria.    Review of patient's allergies indicates:   Allergen Reactions    Levofloxacin Rash     Past Medical History:   Diagnosis Date    Anemia, unspecified     Anxiety disorder, unspecified     COPD (chronic obstructive pulmonary disease)     GERD (gastroesophageal reflux disease)     Hypertension      History reviewed. No pertinent surgical history.  History reviewed. No pertinent family history.  Social History     Tobacco Use    Smoking status: Former     Types: Cigarettes    Smokeless tobacco: Never   Substance Use Topics    Alcohol use: Not Currently    Drug use: Never     Review of Systems   All other systems reviewed and are negative.    Physical Exam     Initial Vitals   BP Pulse Resp Temp SpO2   04/17/23 0811 04/17/23 0801 04/17/23 0801 04/17/23 0801 04/17/23 0801   138/78 91 (!) 22 97.8 °F (36.6 °C) 95 %      MAP       --                Physical Exam    Constitutional: She is not diaphoretic. No distress.   HENT:   Head: Normocephalic and atraumatic.   Eyes: Conjunctivae and EOM are normal. Pupils are equal, round, and reactive to light.   Neck: Neck supple.   Normal range of motion.  Cardiovascular:  Regular rhythm, normal heart sounds and intact distal pulses.           Pulmonary/Chest: Breath sounds normal. No respiratory distress. She has no wheezes. She  has no rhonchi. She has no rales. She exhibits no tenderness.   Abdominal: Abdomen is soft. Bowel sounds are normal.   Musculoskeletal:         General: Normal range of motion.      Cervical back: Normal range of motion and neck supple.     Neurological: She is alert and oriented to person, place, and time. She has normal strength and normal reflexes.   Skin: Skin is warm and dry. Capillary refill takes less than 2 seconds.   Psychiatric: She has a normal mood and affect. Her behavior is normal. Judgment and thought content normal.       ED Course   Procedures  Labs Reviewed   COMPREHENSIVE METABOLIC PANEL - Abnormal; Notable for the following components:       Result Value    Potassium Level 5.7 (*)     Chloride 95 (*)     Creatinine 0.52 (*)     Protein Total 8.8 (*)     Globulin 5.1 (*)     Albumin/Globulin Ratio 0.7 (*)     Aspartate Aminotransferase 35 (*)     All other components within normal limits   URINALYSIS - Abnormal; Notable for the following components:    Protein, UA Trace (*)     Ketones, UA 2+ (*)     Bilirubin, UA 1+ (*)     Leukocyte Esterase, UA Trace (*)     All other components within normal limits   CBC WITH DIFFERENTIAL - Abnormal; Notable for the following components:    WBC 25.8 (*)     MCHC 29.4 (*)     Platelet 450 (*)     Neut # 21.65 (*)     IG# 0.12 (*)     All other components within normal limits   URINALYSIS, MICROSCOPIC - Abnormal; Notable for the following components:    Mucous, UA Small (*)     All other components within normal limits   BLOOD CULTURE OLG - Normal   BLOOD CULTURE OLG - Normal   LIPASE - Normal   TROPONIN I - Normal   B-TYPE NATRIURETIC PEPTIDE - Normal   LACTIC ACID, PLASMA - Normal   TROPONIN I - Normal   CBC W/ AUTO DIFFERENTIAL    Narrative:     The following orders were created for panel order CBC auto differential.  Procedure                               Abnormality         Status                     ---------                               -----------          ------                     CBC with Differential[[id number]]        Abnormal            Final result                 Please view results for these tests on the individual orders.     EKG Readings: (Independently Interpreted)   Sinus tachycardia 115, no acute ST elevation or ST depressions, normal axis.   ECG Results              EKG 12-lead (Final result)  Result time 04/18/23 07:14:41      Final result by Interface, Lab In [facility] (04/18/23 07:14:41)                   Narrative:    Test Reason : J44.9,    Vent. Rate : 115 BPM     Atrial Rate : 115 BPM     P-R Int : 112 ms          QRS Dur : 062 ms      QT Int : 312 ms       P-R-T Axes : 084 089 072 degrees     QTc Int : 431 ms    Sinus tachycardia  Otherwise normal ECG  Confirmed by Lazaro Marrero MD ([id number]) on 4/18/2023 7:14:28 AM    Referred By: [email or web address]   SELF           Confirmed By:Lazaro Marrero MD                                  Imaging Results              X-Ray Chest AP Portable (Final result)  Result time 04/17/23 10:53:49      Final result by Pelon Conley MD (04/17/23 10:53:49)                   Impression:      1. Mild improved aeration of the lungs bilaterally with residual infiltrate, atelectasis, and/or scarring  2. COPD changes with persistent hyper lucency right upper lung  3. Atherosclerosis      Electronically signed by: Pelon Conley  Date:    04/17/2023  Time:    10:53               Narrative:    EXAMINATION:  XR CHEST AP PORTABLE    CLINICAL HISTORY:  , Cough, unspecified.    COMPARISON:  03/07/2023    FINDINGS:  An AP view or more reveals the heart to be normal in size.  The trachea is midline.  Atherosclerosis is seen within the aorta.  There is mild improved aeration of the lungs bilaterally with residual infiltrate, atelectasis, and/or scarring.  There is persistent hyper lucency at the right upper lung.  Atherosclerosis is seen within the aorta.  Noted to the thoracic spine.  Bony structures are osteopenic.  Degenerative changes are  noted to the thoracic spine.                                       Medications   cefTRIAXone (ROCEPHIN) 1 g in dextrose 5 % in water (D5W) 5 % 50 mL IVPB (MB+) (0 g Intravenous Stopped 4/17/23 1028)   acetaminophen tablet 650 mg (650 mg Oral Given 4/17/23 0928)   0.9%  NaCl infusion (0 mLs Intravenous Stopped 4/17/23 1141)     Medical Decision Making:   Clinical Tests:   Lab Tests: Ordered and Reviewed  Radiological Study: Ordered and Reviewed  Medical Tests: Ordered and Reviewed  77-year-old female history of COPD severe on home O2 at nursing home, now with complaints of mild chest discomfort x 4 hours.  Vital signs stable in ED, afebrile, O2 sat 98% on home O2.  Lungs with no significant wheezing rales or rhonchi.  Patient with no peripheral edema.  Labs remarkable for a WBC count of 25.8.  Patient has a normal lactate.  Patient has a normal troponin x2.  Given elevated WBC count and history of productive cough we will start Rocephin 1 g now.  EKG here in ED with no acute ischemic changes.  Chest x-ray with no new focal infiltrate,patient does have chronic COPD changes.  Discussed with patient admission to this hospital but given that there was no bed availability and she would have to be transferred she states that she would prefer to go back to nursing home and start on PO antibiotics.  I discussed the case with Dr. Garrett as her primary care physician who agrees with plan and will follow her up in the nursing home for recheck in 1 day.                        Clinical Impression:   Final diagnoses:  [R05.9] Cough  [R07.89] Atypical chest pain (Primary)  [J06.9] Upper respiratory tract infection, unspecified type        ED Disposition Condition    Discharge Stable          ED Prescriptions       Medication Sig Dispense Start Date End Date Auth. Provider    azithromycin (Z-ASHLEY) 250 MG tablet Take 1 tablet (250 mg total) by mouth once daily. Take first 2 tablets together, then 1 every day until finished. 6 tablet  4/17/2023 -- Tim Mcdermott MD          Follow-up Information       Follow up With Specialties Details Why Contact Info    Germain Garrett MD Family Medicine Schedule an appointment as soon as possible for a visit in 1 day  [address] [place] [id number]  [phone]               Tim Mcdermott MD  04/19/23 6452

## 2023-04-18 ENCOUNTER — OFFICE VISIT (OUTPATIENT)
Dept: ORTHOPEDICS | Facility: CLINIC | Age: 78
End: 2023-04-18
Payer: MEDICARE

## 2023-04-18 ENCOUNTER — HOSPITAL ENCOUNTER (OUTPATIENT)
Dept: RADIOLOGY | Facility: CLINIC | Age: 78
Discharge: HOME | End: 2023-04-18
Attending: ORTHOPAEDIC SURGERY
Payer: MEDICARE

## 2023-04-18 VITALS
DIASTOLIC BLOOD PRESSURE: 61 MMHG | HEART RATE: 109 BPM | SYSTOLIC BLOOD PRESSURE: 113 MMHG | RESPIRATION RATE: 18 BRPM | HEIGHT: 60 IN | BODY MASS INDEX: 15.33 KG/M2 | WEIGHT: 78.06 LBS

## 2023-04-18 DIAGNOSIS — S52.532D CLOSED COLLES' FRACTURE OF LEFT RADIUS WITH ROUTINE HEALING, SUBSEQUENT ENCOUNTER: ICD-10-CM

## 2023-04-18 DIAGNOSIS — S52.532D CLOSED COLLES' FRACTURE OF LEFT RADIUS WITH ROUTINE HEALING, SUBSEQUENT ENCOUNTER: Primary | ICD-10-CM

## 2023-04-18 PROCEDURE — 99024 POSTOP FOLLOW-UP VISIT: CPT | Mod: POP,,, | Performed by: NURSE PRACTITIONER

## 2023-04-18 PROCEDURE — 73110 XR WRIST COMPLETE 3 VIEWS LEFT: ICD-10-PCS | Mod: LT,,, | Performed by: ORTHOPAEDIC SURGERY

## 2023-04-18 PROCEDURE — 99024 PR POST-OP FOLLOW-UP VISIT: ICD-10-PCS | Mod: POP,,, | Performed by: NURSE PRACTITIONER

## 2023-04-18 PROCEDURE — 73110 X-RAY EXAM OF WRIST: CPT | Mod: LT,,, | Performed by: ORTHOPAEDIC SURGERY

## 2023-04-18 NOTE — PROGRESS NOTES
Subjective:       Patient ID: Kylie Almonte is a 77 y.o. female.    Chief Complaint   Patient presents with    Left Wrist - Follow-up     5 WEEK F/U LEFT DISTAL RADIUS FX, CAST INTACT. NO COMPLAINTS.        Patient is here today for follow up evaluation 5 weeks out from left distal radius fracture treated non op. She comes in with her cast clean and intact. She states she has not had any pain to her wrist. She has been compliant with her NWB/lifting restrictions. She has no complaints or issues to report today. She is here with her family.      Review of Systems   Constitutional: Negative for chills and fever.   HENT:  Negative for congestion and hearing loss.    Eyes:  Negative for visual disturbance.   Cardiovascular:  Negative for chest pain and syncope.   Respiratory:  Negative for cough and shortness of breath.    Hematologic/Lymphatic: Does not bruise/bleed easily.   Skin:  Negative for color change and rash.   Gastrointestinal:  Negative for abdominal pain, nausea and vomiting.   Genitourinary:  Negative for dysuria and hematuria.   Neurological:  Negative for numbness, sensory change and weakness.   Psychiatric/Behavioral:  Negative for altered mental status.       Current Outpatient Medications on File Prior to Visit   Medication Sig Dispense Refill    albuterol-ipratropium (DUO-NEB) 2.5 mg-0.5 mg/3 mL nebulizer solution Take by nebulization.      alendronate (FOSAMAX) 70 MG tablet Take 70 mg by mouth every 7 days.      ALPRAZolam (XANAX) 0.5 MG tablet Take 0.5 mg by mouth 4 (four) times daily as needed.      amLODIPine (NORVASC) 5 MG tablet Take 5 mg by mouth once daily.      atorvastatin (LIPITOR) 10 MG tablet Take 10 mg by mouth once daily.      azithromycin (Z-ASHLEY) 250 MG tablet Take 1 tablet (250 mg total) by mouth once daily. Take first 2 tablets together, then 1 every day until finished. 6 tablet 0    BREO ELLIPTA 200-25 mcg/dose DsDv diskus inhaler Inhale 1 puff into the lungs once daily.       budesonide (PULMICORT) 0.5 mg/2 mL nebulizer solution Take by nebulization.      cholecalciferol, vitamin D3, (VITAMIN D3) 25 mcg (1,000 unit) capsule Take 1,000 Units by mouth.      gabapentin (NEURONTIN) 300 MG capsule Take 300 mg by mouth 3 (three) times daily.      lactulose (CHRONULAC) 10 gram/15 mL solution SMARTSIG:Milliliter(s) By Mouth      omeprazole (PRILOSEC) 40 MG capsule Take 40 mg by mouth.      potassium chloride SA (K-DUR,KLOR-CON) 20 MEQ tablet Take 20 mEq by mouth 2 (two) times daily.      sertraline (ZOLOFT) 50 MG tablet Take 50 mg by mouth once daily.      traMADoL (ULTRAM) 50 mg tablet Take 1 tablet (50 mg total) by mouth every 6 (six) hours as needed for Pain. 12 tablet 0    VENTOLIN HFA 90 mcg/actuation inhaler Inhale 2 puffs into the lungs every 4 to 6 hours as needed.      levETIRAcetam (KEPPRA) 500 MG Tab Take 1 tablet (500 mg total) by mouth 2 (two) times daily. for 6 days 12 tablet 0     No current facility-administered medications on file prior to visit.          Objective:      /61   Pulse 109   Resp 18   Ht 5' (1.524 m)   Wt 35.4 kg (78 lb 0.7 oz)   BMI 15.24 kg/m²   Physical Exam  Constitutional:       General: She is not in acute distress.     Appearance: Normal appearance.   HENT:      Head: Normocephalic and atraumatic.      Mouth/Throat:      Mouth: Mucous membranes are moist.   Eyes:      Extraocular Movements: Extraocular movements intact.   Cardiovascular:      Rate and Rhythm: Normal rate.      Pulses: Normal pulses.   Pulmonary:      Effort: Pulmonary effort is normal. No respiratory distress.   Abdominal:      General: There is no distension.      Palpations: Abdomen is soft.      Tenderness: There is no abdominal tenderness.   Musculoskeletal:      Cervical back: Normal range of motion and neck supple.      Comments: Left wrist: cast removed. Skin intact with no erythema or open wounds. Minimal swelling at wrist. No notable deformity. She has soreness and  stiffness with active ROM of the wrist as expected but is motor intact. Compartments soft and compressible  Radial pulse palpable  AIN/PIN/ulnar nerves motor intact  Flexes and extends digits  Brisk capillary refill distally  Sensation to light touch intact distally     Neurological:      Mental Status: She is alert and oriented to person, place, and time. Mental status is at baseline.   Psychiatric:         Mood and Affect: Mood normal.         Behavior: Behavior normal.         Thought Content: Thought content normal.         Judgment: Judgment normal.      Body mass index is 15.24 kg/m².    Radiology: 3 view xray L wrist: distal radius fracture with alignment unchanged compared to previous films and early bone healing noted        Assessment:         1. Closed Colles' fracture of left radius with routine healing, subsequent encounter  X-Ray Wrist Complete Left              Plan:     Pt is doing well today 5 weeks out from non op mgmt of L distal radius fracture. Xrays demonstrate stable alignment today. Fracture is amenable to continued non op mgmt. Cast was removed today. She was fitted for a forearm exos brace. She can remove it daily for shower and gentle active ROM exercises to wrist/digits. Remain NWB to L wrist with no lifting, pushing, pulling >2 lbs. F/u in 6 weeks for repeat xrays and eval. All questions/concerns were addressed with pt and family. They understand and agree with the plan.     The above findings, diagnosis, and treatment plan were discussed with Dr. Yfn Monsivais who is in agreement.      Follow up in about 6 weeks (around 5/30/2023).    Closed Colles' fracture of left radius with routine healing, subsequent encounter  -     X-Ray Wrist Complete Left; Future; Expected date: 04/18/2023              Orders Placed This Encounter   Procedures    X-Ray Wrist Complete Left     Standing Status:   Future     Number of Occurrences:   1     Standing Expiration Date:   4/18/2024     Order Specific  Question:   May the Radiologist modify the order per protocol to meet the clinical needs of the patient?     Answer:   Yes     Order Specific Question:   Release to patient     Answer:   Immediate       No future appointments.

## 2023-04-22 LAB
BACTERIA BLD CULT: NORMAL
BACTERIA BLD CULT: NORMAL

## 2023-04-29 ENCOUNTER — LAB REQUISITION (OUTPATIENT)
Dept: LAB | Facility: HOSPITAL | Age: 78
End: 2023-04-29
Payer: MEDICARE

## 2023-04-29 DIAGNOSIS — H02.846 EDEMA OF LEFT EYE, UNSPECIFIED EYELID: ICD-10-CM

## 2023-04-29 LAB
ALBUMIN SERPL-MCNC: 3.3 G/DL (ref 3.4–4.8)
ALBUMIN/GLOB SERPL: 0.9 RATIO (ref 1.1–2)
ALP SERPL-CCNC: 83 UNIT/L (ref 40–150)
ALT SERPL-CCNC: 12 UNIT/L (ref 0–55)
AST SERPL-CCNC: 20 UNIT/L (ref 5–34)
BASOPHILS # BLD AUTO: 0.08 X10(3)/MCL (ref 0–0.2)
BASOPHILS NFR BLD AUTO: 0.6 %
BILIRUBIN DIRECT+TOT PNL SERPL-MCNC: 0.2 MG/DL
BUN SERPL-MCNC: 16 MG/DL (ref 9.8–20.1)
CALCIUM SERPL-MCNC: 9.9 MG/DL (ref 8.4–10.2)
CHLORIDE SERPL-SCNC: 94 MMOL/L (ref 98–107)
CO2 SERPL-SCNC: 36 MMOL/L (ref 23–31)
CREAT SERPL-MCNC: 0.49 MG/DL (ref 0.55–1.02)
EOSINOPHIL # BLD AUTO: 0.26 X10(3)/MCL (ref 0–0.9)
EOSINOPHIL NFR BLD AUTO: 2 %
ERYTHROCYTE [DISTWIDTH] IN BLOOD BY AUTOMATED COUNT: 14.5 % (ref 11.5–17)
GFR SERPLBLD CREATININE-BSD FMLA CKD-EPI: >60 MLS/MIN/1.73/M2
GLOBULIN SER-MCNC: 3.6 GM/DL (ref 2.4–3.5)
GLUCOSE SERPL-MCNC: 132 MG/DL (ref 82–115)
HCT VFR BLD AUTO: 36.2 % (ref 37–47)
HGB BLD-MCNC: 10.5 G/DL (ref 12–16)
IMM GRANULOCYTES # BLD AUTO: 0.07 X10(3)/MCL (ref 0–0.04)
IMM GRANULOCYTES NFR BLD AUTO: 0.5 %
LYMPHOCYTES # BLD AUTO: 1.5 X10(3)/MCL (ref 0.6–4.6)
LYMPHOCYTES NFR BLD AUTO: 11.7 %
MCH RBC QN AUTO: 27.5 PG (ref 27–31)
MCHC RBC AUTO-ENTMCNC: 29 G/DL (ref 33–36)
MCV RBC AUTO: 94.8 FL (ref 80–94)
MONOCYTES # BLD AUTO: 0.59 X10(3)/MCL (ref 0.1–1.3)
MONOCYTES NFR BLD AUTO: 4.6 %
NEUTROPHILS # BLD AUTO: 10.28 X10(3)/MCL (ref 2.1–9.2)
NEUTROPHILS NFR BLD AUTO: 80.6 %
PLATELET # BLD AUTO: 445 X10(3)/MCL (ref 130–400)
PMV BLD AUTO: 9.4 FL (ref 7.4–10.4)
POTASSIUM SERPL-SCNC: 4.8 MMOL/L (ref 3.5–5.1)
PROT SERPL-MCNC: 6.9 GM/DL (ref 5.8–7.6)
RBC # BLD AUTO: 3.82 X10(6)/MCL (ref 4.2–5.4)
SODIUM SERPL-SCNC: 139 MMOL/L (ref 136–145)
WBC # SPEC AUTO: 12.8 X10(3)/MCL (ref 4.5–11.5)

## 2023-04-29 PROCEDURE — 85025 COMPLETE CBC W/AUTO DIFF WBC: CPT | Performed by: FAMILY MEDICINE

## 2023-04-29 PROCEDURE — 80053 COMPREHEN METABOLIC PANEL: CPT | Performed by: FAMILY MEDICINE

## 2023-05-31 ENCOUNTER — LAB REQUISITION (OUTPATIENT)
Dept: LAB | Facility: HOSPITAL | Age: 78
End: 2023-05-31
Payer: MEDICARE

## 2023-05-31 DIAGNOSIS — J44.9 CHRONIC OBSTRUCTIVE PULMONARY DISEASE, UNSPECIFIED: ICD-10-CM

## 2023-05-31 DIAGNOSIS — E87.6 HYPOKALEMIA: ICD-10-CM

## 2023-05-31 LAB
ALBUMIN SERPL-MCNC: 3 G/DL (ref 3.4–4.8)
ALBUMIN/GLOB SERPL: 0.7 RATIO (ref 1.1–2)
ALP SERPL-CCNC: 102 UNIT/L (ref 40–150)
ALT SERPL-CCNC: 8 UNIT/L (ref 0–55)
AST SERPL-CCNC: 14 UNIT/L (ref 5–34)
BASOPHILS # BLD AUTO: 0.05 X10(3)/MCL
BASOPHILS NFR BLD AUTO: 0.3 %
BILIRUBIN DIRECT+TOT PNL SERPL-MCNC: 0.1 MG/DL
BUN SERPL-MCNC: 26 MG/DL (ref 9.8–20.1)
CALCIUM SERPL-MCNC: 10.1 MG/DL (ref 8.4–10.2)
CHLORIDE SERPL-SCNC: 87 MMOL/L (ref 98–107)
CO2 SERPL-SCNC: 46 MMOL/L (ref 23–31)
CREAT SERPL-MCNC: 0.47 MG/DL (ref 0.55–1.02)
EOSINOPHIL # BLD AUTO: 0.09 X10(3)/MCL (ref 0–0.9)
EOSINOPHIL NFR BLD AUTO: 0.5 %
ERYTHROCYTE [DISTWIDTH] IN BLOOD BY AUTOMATED COUNT: 13.3 % (ref 11.5–17)
GFR SERPLBLD CREATININE-BSD FMLA CKD-EPI: >60 MLS/MIN/1.73/M2
GLOBULIN SER-MCNC: 4.1 GM/DL (ref 2.4–3.5)
GLUCOSE SERPL-MCNC: 193 MG/DL (ref 82–115)
HCT VFR BLD AUTO: 38.2 % (ref 37–47)
HGB BLD-MCNC: 10.4 G/DL (ref 12–16)
IMM GRANULOCYTES # BLD AUTO: 0.05 X10(3)/MCL (ref 0–0.04)
IMM GRANULOCYTES NFR BLD AUTO: 0.3 %
LYMPHOCYTES # BLD AUTO: 0.8 X10(3)/MCL (ref 0.6–4.6)
LYMPHOCYTES NFR BLD AUTO: 4.4 %
MCH RBC QN AUTO: 27.4 PG (ref 27–31)
MCHC RBC AUTO-ENTMCNC: 27.2 G/DL (ref 33–36)
MCV RBC AUTO: 100.5 FL (ref 80–94)
MONOCYTES # BLD AUTO: 1.13 X10(3)/MCL (ref 0.1–1.3)
MONOCYTES NFR BLD AUTO: 6.2 %
NEUTROPHILS # BLD AUTO: 16.21 X10(3)/MCL (ref 2.1–9.2)
NEUTROPHILS NFR BLD AUTO: 88.3 %
PLATELET # BLD AUTO: 432 X10(3)/MCL (ref 130–400)
PMV BLD AUTO: 10 FL (ref 7.4–10.4)
POTASSIUM SERPL-SCNC: 4.3 MMOL/L (ref 3.5–5.1)
PROT SERPL-MCNC: 7.1 GM/DL (ref 5.8–7.6)
RBC # BLD AUTO: 3.8 X10(6)/MCL (ref 4.2–5.4)
SODIUM SERPL-SCNC: 143 MMOL/L (ref 136–145)
WBC # SPEC AUTO: 18.33 X10(3)/MCL (ref 4.5–11.5)

## 2023-05-31 PROCEDURE — 80053 COMPREHEN METABOLIC PANEL: CPT | Performed by: FAMILY MEDICINE

## 2023-05-31 PROCEDURE — 85025 COMPLETE CBC W/AUTO DIFF WBC: CPT | Performed by: FAMILY MEDICINE

## 2023-06-12 PROBLEM — J96.11 CHRONIC RESPIRATORY FAILURE WITH HYPOXIA: Status: RESOLVED | Noted: 2020-01-16 | Resolved: 2023-06-12
